# Patient Record
Sex: MALE | Race: WHITE | NOT HISPANIC OR LATINO | Employment: OTHER | URBAN - METROPOLITAN AREA
[De-identification: names, ages, dates, MRNs, and addresses within clinical notes are randomized per-mention and may not be internally consistent; named-entity substitution may affect disease eponyms.]

---

## 2018-06-16 ENCOUNTER — HOSPITAL ENCOUNTER (INPATIENT)
Facility: MEDICAL CENTER | Age: 68
LOS: 3 days | DRG: 042 | End: 2018-06-20
Attending: EMERGENCY MEDICINE | Admitting: HOSPITALIST
Payer: MEDICARE

## 2018-06-16 DIAGNOSIS — I63.512 CEREBROVASCULAR ACCIDENT (CVA) DUE TO OCCLUSION OF LEFT MIDDLE CEREBRAL ARTERY (HCC): ICD-10-CM

## 2018-06-16 DIAGNOSIS — I63.9 ACUTE CVA (CEREBROVASCULAR ACCIDENT) (HCC): ICD-10-CM

## 2018-06-16 PROCEDURE — 99285 EMERGENCY DEPT VISIT HI MDM: CPT

## 2018-06-17 ENCOUNTER — HOSPITAL ENCOUNTER (OUTPATIENT)
Dept: RADIOLOGY | Facility: MEDICAL CENTER | Age: 68
End: 2018-06-17

## 2018-06-17 ENCOUNTER — APPOINTMENT (OUTPATIENT)
Dept: RADIOLOGY | Facility: MEDICAL CENTER | Age: 68
DRG: 042 | End: 2018-06-17
Attending: NURSE PRACTITIONER
Payer: MEDICARE

## 2018-06-17 ENCOUNTER — APPOINTMENT (OUTPATIENT)
Dept: RADIOLOGY | Facility: MEDICAL CENTER | Age: 68
DRG: 042 | End: 2018-06-17
Attending: EMERGENCY MEDICINE
Payer: MEDICARE

## 2018-06-17 PROBLEM — I63.9 ACUTE CVA (CEREBROVASCULAR ACCIDENT) (HCC): Status: ACTIVE | Noted: 2018-06-17

## 2018-06-17 PROBLEM — I10 ESSENTIAL HYPERTENSION: Status: ACTIVE | Noted: 2018-06-17

## 2018-06-17 LAB
ANION GAP SERPL CALC-SCNC: 8 MMOL/L (ref 0–11.9)
APTT PPP: 30.3 SEC (ref 24.7–36)
BASOPHILS # BLD AUTO: 0.6 % (ref 0–1.8)
BASOPHILS # BLD: 0.05 K/UL (ref 0–0.12)
BUN SERPL-MCNC: 17 MG/DL (ref 8–22)
CALCIUM SERPL-MCNC: 9 MG/DL (ref 8.5–10.5)
CHLORIDE SERPL-SCNC: 110 MMOL/L (ref 96–112)
CO2 SERPL-SCNC: 22 MMOL/L (ref 20–33)
CREAT SERPL-MCNC: 0.78 MG/DL (ref 0.5–1.4)
EKG IMPRESSION: NORMAL
EOSINOPHIL # BLD AUTO: 0.2 K/UL (ref 0–0.51)
EOSINOPHIL NFR BLD: 2.5 % (ref 0–6.9)
ERYTHROCYTE [DISTWIDTH] IN BLOOD BY AUTOMATED COUNT: 43.7 FL (ref 35.9–50)
EST. AVERAGE GLUCOSE BLD GHB EST-MCNC: 114 MG/DL
GLUCOSE SERPL-MCNC: 112 MG/DL (ref 65–99)
HBA1C MFR BLD: 5.6 % (ref 0–5.6)
HCT VFR BLD AUTO: 43.7 % (ref 42–52)
HGB BLD-MCNC: 14.7 G/DL (ref 14–18)
IMM GRANULOCYTES # BLD AUTO: 0.01 K/UL (ref 0–0.11)
IMM GRANULOCYTES NFR BLD AUTO: 0.1 % (ref 0–0.9)
INR PPP: 1.03 (ref 0.87–1.13)
LYMPHOCYTES # BLD AUTO: 2.14 K/UL (ref 1–4.8)
LYMPHOCYTES NFR BLD: 26.3 % (ref 22–41)
MCH RBC QN AUTO: 30 PG (ref 27–33)
MCHC RBC AUTO-ENTMCNC: 33.6 G/DL (ref 33.7–35.3)
MCV RBC AUTO: 89.2 FL (ref 81.4–97.8)
MONOCYTES # BLD AUTO: 0.64 K/UL (ref 0–0.85)
MONOCYTES NFR BLD AUTO: 7.9 % (ref 0–13.4)
NEUTROPHILS # BLD AUTO: 5.09 K/UL (ref 1.82–7.42)
NEUTROPHILS NFR BLD: 62.6 % (ref 44–72)
NRBC # BLD AUTO: 0 K/UL
NRBC BLD-RTO: 0 /100 WBC
PLATELET # BLD AUTO: 167 K/UL (ref 164–446)
PMV BLD AUTO: 10.1 FL (ref 9–12.9)
POTASSIUM SERPL-SCNC: 3.6 MMOL/L (ref 3.6–5.5)
PROTHROMBIN TIME: 13.2 SEC (ref 12–14.6)
RBC # BLD AUTO: 4.9 M/UL (ref 4.7–6.1)
SODIUM SERPL-SCNC: 140 MMOL/L (ref 135–145)
TROPONIN I SERPL-MCNC: <0.01 NG/ML (ref 0–0.04)
TSH SERPL DL<=0.005 MIU/L-ACNC: 1.95 UIU/ML (ref 0.38–5.33)
WBC # BLD AUTO: 8.1 K/UL (ref 4.8–10.8)

## 2018-06-17 PROCEDURE — 700105 HCHG RX REV CODE 258: Performed by: HOSPITALIST

## 2018-06-17 PROCEDURE — G8988 SELF CARE GOAL STATUS: HCPCS | Mod: CI

## 2018-06-17 PROCEDURE — G8979 MOBILITY GOAL STATUS: HCPCS | Mod: CH

## 2018-06-17 PROCEDURE — 700111 HCHG RX REV CODE 636 W/ 250 OVERRIDE (IP): Performed by: HOSPITALIST

## 2018-06-17 PROCEDURE — 97166 OT EVAL MOD COMPLEX 45 MIN: CPT

## 2018-06-17 PROCEDURE — 99223 1ST HOSP IP/OBS HIGH 75: CPT | Mod: AI | Performed by: HOSPITALIST

## 2018-06-17 PROCEDURE — 85730 THROMBOPLASTIN TIME PARTIAL: CPT

## 2018-06-17 PROCEDURE — 85610 PROTHROMBIN TIME: CPT

## 2018-06-17 PROCEDURE — 700102 HCHG RX REV CODE 250 W/ 637 OVERRIDE(OP): Performed by: HOSPITALIST

## 2018-06-17 PROCEDURE — 97161 PT EVAL LOW COMPLEX 20 MIN: CPT

## 2018-06-17 PROCEDURE — G8987 SELF CARE CURRENT STATUS: HCPCS | Mod: CK

## 2018-06-17 PROCEDURE — 770020 HCHG ROOM/CARE - TELE (206)

## 2018-06-17 PROCEDURE — 36415 COLL VENOUS BLD VENIPUNCTURE: CPT

## 2018-06-17 PROCEDURE — 93005 ELECTROCARDIOGRAM TRACING: CPT | Performed by: NURSE PRACTITIONER

## 2018-06-17 PROCEDURE — 70549 MR ANGIOGRAPH NECK W/O&W/DYE: CPT

## 2018-06-17 PROCEDURE — 84443 ASSAY THYROID STIM HORMONE: CPT

## 2018-06-17 PROCEDURE — 80048 BASIC METABOLIC PNL TOTAL CA: CPT

## 2018-06-17 PROCEDURE — 70551 MRI BRAIN STEM W/O DYE: CPT

## 2018-06-17 PROCEDURE — 700117 HCHG RX CONTRAST REV CODE 255: Performed by: FAMILY MEDICINE

## 2018-06-17 PROCEDURE — 84484 ASSAY OF TROPONIN QUANT: CPT

## 2018-06-17 PROCEDURE — 83036 HEMOGLOBIN GLYCOSYLATED A1C: CPT

## 2018-06-17 PROCEDURE — G8980 MOBILITY D/C STATUS: HCPCS | Mod: CH

## 2018-06-17 PROCEDURE — 85025 COMPLETE CBC W/AUTO DIFF WBC: CPT

## 2018-06-17 PROCEDURE — A9577 INJ MULTIHANCE: HCPCS | Performed by: FAMILY MEDICINE

## 2018-06-17 PROCEDURE — G8978 MOBILITY CURRENT STATUS: HCPCS | Mod: CH

## 2018-06-17 PROCEDURE — A9270 NON-COVERED ITEM OR SERVICE: HCPCS | Performed by: HOSPITALIST

## 2018-06-17 PROCEDURE — 93306 TTE W/DOPPLER COMPLETE: CPT | Mod: 26 | Performed by: INTERNAL MEDICINE

## 2018-06-17 PROCEDURE — 93010 ELECTROCARDIOGRAM REPORT: CPT | Performed by: INTERNAL MEDICINE

## 2018-06-17 RX ORDER — SODIUM CHLORIDE 9 MG/ML
INJECTION, SOLUTION INTRAVENOUS CONTINUOUS
Status: DISCONTINUED | OUTPATIENT
Start: 2018-06-17 | End: 2018-06-18

## 2018-06-17 RX ORDER — ONDANSETRON 4 MG/1
4 TABLET, ORALLY DISINTEGRATING ORAL EVERY 4 HOURS PRN
Status: DISCONTINUED | OUTPATIENT
Start: 2018-06-17 | End: 2018-06-20 | Stop reason: HOSPADM

## 2018-06-17 RX ORDER — AMOXICILLIN 250 MG
2 CAPSULE ORAL 2 TIMES DAILY
Status: DISCONTINUED | OUTPATIENT
Start: 2018-06-17 | End: 2018-06-20 | Stop reason: HOSPADM

## 2018-06-17 RX ORDER — ONDANSETRON 2 MG/ML
4 INJECTION INTRAMUSCULAR; INTRAVENOUS EVERY 4 HOURS PRN
Status: DISCONTINUED | OUTPATIENT
Start: 2018-06-17 | End: 2018-06-20 | Stop reason: HOSPADM

## 2018-06-17 RX ORDER — ACETAMINOPHEN 325 MG/1
650 TABLET ORAL EVERY 6 HOURS PRN
Status: DISCONTINUED | OUTPATIENT
Start: 2018-06-17 | End: 2018-06-20 | Stop reason: HOSPADM

## 2018-06-17 RX ORDER — POLYETHYLENE GLYCOL 3350 17 G/17G
1 POWDER, FOR SOLUTION ORAL
Status: DISCONTINUED | OUTPATIENT
Start: 2018-06-17 | End: 2018-06-20 | Stop reason: HOSPADM

## 2018-06-17 RX ORDER — LABETALOL HYDROCHLORIDE 5 MG/ML
10 INJECTION, SOLUTION INTRAVENOUS EVERY 4 HOURS PRN
Status: DISCONTINUED | OUTPATIENT
Start: 2018-06-17 | End: 2018-06-20 | Stop reason: HOSPADM

## 2018-06-17 RX ORDER — BISACODYL 10 MG
10 SUPPOSITORY, RECTAL RECTAL
Status: DISCONTINUED | OUTPATIENT
Start: 2018-06-17 | End: 2018-06-20 | Stop reason: HOSPADM

## 2018-06-17 RX ORDER — ASPIRIN 300 MG/1
300 SUPPOSITORY RECTAL DAILY
Status: DISCONTINUED | OUTPATIENT
Start: 2018-06-17 | End: 2018-06-20 | Stop reason: HOSPADM

## 2018-06-17 RX ORDER — ASPIRIN 81 MG/1
324 TABLET, CHEWABLE ORAL DAILY
Status: DISCONTINUED | OUTPATIENT
Start: 2018-06-17 | End: 2018-06-20 | Stop reason: HOSPADM

## 2018-06-17 RX ORDER — HYDRALAZINE HYDROCHLORIDE 20 MG/ML
10 INJECTION INTRAMUSCULAR; INTRAVENOUS
Status: DISCONTINUED | OUTPATIENT
Start: 2018-06-17 | End: 2018-06-20 | Stop reason: HOSPADM

## 2018-06-17 RX ORDER — ATORVASTATIN CALCIUM 80 MG/1
80 TABLET, FILM COATED ORAL EVERY EVENING
Status: DISCONTINUED | OUTPATIENT
Start: 2018-06-17 | End: 2018-06-20 | Stop reason: HOSPADM

## 2018-06-17 RX ORDER — ASPIRIN 325 MG
325 TABLET ORAL DAILY
Status: DISCONTINUED | OUTPATIENT
Start: 2018-06-17 | End: 2018-06-20 | Stop reason: HOSPADM

## 2018-06-17 RX ADMIN — GADOBENATE DIMEGLUMINE 10 ML: 529 INJECTION, SOLUTION INTRAVENOUS at 14:43

## 2018-06-17 RX ADMIN — ASPIRIN 325 MG: 325 TABLET ORAL at 08:43

## 2018-06-17 RX ADMIN — ENOXAPARIN SODIUM 40 MG: 100 INJECTION SUBCUTANEOUS at 08:43

## 2018-06-17 RX ADMIN — SODIUM CHLORIDE: 9 INJECTION, SOLUTION INTRAVENOUS at 06:08

## 2018-06-17 RX ADMIN — ATORVASTATIN CALCIUM 80 MG: 80 TABLET, FILM COATED ORAL at 20:42

## 2018-06-17 ASSESSMENT — COGNITIVE AND FUNCTIONAL STATUS - GENERAL
HELP NEEDED FOR BATHING: A LITTLE
MOBILITY SCORE: 24
MOBILITY SCORE: 24
HELP NEEDED FOR BATHING: A LOT
SUGGESTED CMS G CODE MODIFIER MOBILITY: CH
DRESSING REGULAR UPPER BODY CLOTHING: A LITTLE
SUGGESTED CMS G CODE MODIFIER DAILY ACTIVITY: CK
DAILY ACTIVITIY SCORE: 19
SUGGESTED CMS G CODE MODIFIER DAILY ACTIVITY: CK
TOILETING: A LITTLE
PERSONAL GROOMING: A LITTLE
SUGGESTED CMS G CODE MODIFIER MOBILITY: CH
PERSONAL GROOMING: A LITTLE
DRESSING REGULAR LOWER BODY CLOTHING: A LITTLE
DAILY ACTIVITIY SCORE: 17
DRESSING REGULAR LOWER BODY CLOTHING: A LITTLE
TOILETING: A LOT
DRESSING REGULAR UPPER BODY CLOTHING: A LITTLE

## 2018-06-17 ASSESSMENT — ENCOUNTER SYMPTOMS
GASTROINTESTINAL NEGATIVE: 1
FOCAL WEAKNESS: 1
RESPIRATORY NEGATIVE: 1
TINGLING: 1
SPEECH CHANGE: 1
MUSCULOSKELETAL NEGATIVE: 1
EYES NEGATIVE: 1
PSYCHIATRIC NEGATIVE: 1
CONSTITUTIONAL NEGATIVE: 1
CARDIOVASCULAR NEGATIVE: 1
SENSORY CHANGE: 1

## 2018-06-17 ASSESSMENT — LIFESTYLE VARIABLES
EVER_SMOKED: YES
ALCOHOL_USE: YES
TOTAL SCORE: 0
EVER HAD A DRINK FIRST THING IN THE MORNING TO STEADY YOUR NERVES TO GET RID OF A HANGOVER: NO
HAVE YOU EVER FELT YOU SHOULD CUT DOWN ON YOUR DRINKING: NO
CONSUMPTION TOTAL: INCOMPLETE
HAVE YOU EVER FELT YOU SHOULD CUT DOWN ON YOUR DRINKING: NO
DO YOU DRINK ALCOHOL: NO
EVER HAD A DRINK FIRST THING IN THE MORNING TO STEADY YOUR NERVES TO GET RID OF A HANGOVER: NO
EVER FELT BAD OR GUILTY ABOUT YOUR DRINKING: NO
TOTAL SCORE: 0
ALCOHOL_USE: YES
TOTAL SCORE: 0
TOTAL SCORE: 0
HAVE PEOPLE ANNOYED YOU BY CRITICIZING YOUR DRINKING: NO
TOTAL SCORE: 0
CONSUMPTION TOTAL: INCOMPLETE
EVER FELT BAD OR GUILTY ABOUT YOUR DRINKING: NO
HAVE PEOPLE ANNOYED YOU BY CRITICIZING YOUR DRINKING: NO
TOTAL SCORE: 0

## 2018-06-17 ASSESSMENT — ACTIVITIES OF DAILY LIVING (ADL): TOILETING: INDEPENDENT

## 2018-06-17 ASSESSMENT — PAIN SCALES - GENERAL
PAINLEVEL_OUTOF10: 0

## 2018-06-17 ASSESSMENT — GAIT ASSESSMENTS
GAIT LEVEL OF ASSIST: SUPERVISED
DISTANCE (FEET): 1000

## 2018-06-17 ASSESSMENT — PATIENT HEALTH QUESTIONNAIRE - PHQ9
1. LITTLE INTEREST OR PLEASURE IN DOING THINGS: NOT AT ALL
2. FEELING DOWN, DEPRESSED, IRRITABLE, OR HOPELESS: NOT AT ALL
SUM OF ALL RESPONSES TO PHQ9 QUESTIONS 1 AND 2: 0

## 2018-06-17 NOTE — PROGRESS NOTES
- Patient transfer here from [facility] this morning for Left MCA occlusion   - right-sided weakness now resolved. Question some very mild expressive aphasia. No other neuro deficits.  - MRI done with results pending  - consulted Dr. Virgen - Neurology  BRADY Gray

## 2018-06-17 NOTE — THERAPY
"Occupational Therapy Evaluation completed.   Functional Status:  Pt is a 67 y/o male transferred from [facility] for R sided weakness/expressive aphasia. MRI and CT  revealed a L distal M1 occluded CVA with watershed pattern. Pt has moderate-severe Broca's aphasia, often interchanging words. Pt appears to have global aphasia as patient cannot confirm statements prior said with any accuracy. Pt reports living with girlfriend in a H outside [place]. Pt reports generalized numbness of R hand distal of shoulder, no numbness in RLE. Pt has 3+/5 RUE strength, 5/5 on LUE. Pt has dysdiadochokinesia, poor proprioception. Pt denied vision loss, but does not consistently track objects peripherally. Pt ambulated in hallway with fair- balance, is a severe fall risk. Pt was frustrated at EOS with all of testing performed, has poor insight into deficits. Pt would be an ideal candidate for acute rehabilitation d/t inability to care for ADLs safely at this time.   Plan of Care: Will benefit from Occupational Therapy 3 times per week  Discharge Recommendations:  Equipment: Will Continue to Assess for Equipment Needs. Post-acute therapy Discharge to a transitional care facility for continued skilled therapy services.    See \"Rehab Therapy-Acute\" Patient Summary Report for complete documentation.    "

## 2018-06-17 NOTE — PROGRESS NOTES
Pt is A&Ox4 on RA. Pt is on tele. Pt denies needs or pain at this time. Hourly rounding in place.

## 2018-06-17 NOTE — ASSESSMENT & PLAN NOTE
- not currently on any medication  - several days out from acute CVA -- period of permissive HTN to wean and start BP control PRN

## 2018-06-17 NOTE — ED NOTES
Patient transported to floor via wheelchair in stable condition accompanied by ED Tech. All belongings accounted for.

## 2018-06-17 NOTE — CONSULTS
CC:  HAD A STROKE    Date of Admission: 6/16/2018    Today's Date: 06/17/18    Consulting Physician: Goyo Xie M.D.      HPI:    Cortez Mari is a 68 y.o. male offers little hx, per chart >24hrs of symptoms severe right sided weakness and speech difficulty. Denies drug or sympathomimetics.  No other complaints.  Unable to obtain more details of hx.      ROS:   Constitutional: No fevers or chills.  Eyes: No blurry vision or eye pain.  ENT: No dysphagia or hearing loss.  Respiratory: No cough or shortness of breath.  Cardiovascular: No chest pain or palpitations.  GI: No nausea, vomiting, or diarrhea.  : No urinary incontinence or dysuria.  Musculoskeletal: No joint swelling or arthralgias.  Skin: No skin rashes.  Neuro: See HPI.  Endocrine: No heat or cold intolerance. No polydipsia or polyuria.  Psych: No depression or anxiety.  Heme/Lymph: No easy bruising or swollen lymph nodes.  All other systems were reviewed and were negative.       Past Medical History:   Past Medical History:   Diagnosis Date   • Smoker        Past Surgical History:   Past Surgical History:   Procedure Laterality Date   • INGUINAL HERNIA REPAIR  2/14/2011    Performed by KELLY CHIN at SURGERY SAME DAY [facility] ORS   • OTHER      eye       Social History:   Social History     Social History   • Marital status:      Spouse name: N/A   • Number of children: N/A   • Years of education: N/A     Occupational History   • Not on file.     Social History Main Topics   • Smoking status: Current Every Day Smoker     Packs/day: 1.00     Types: Cigarettes   • Smokeless tobacco: Never Used      Comment: 0.5ppd x35 yrs   • Alcohol use No   • Drug use: No   • Sexual activity: Not on file     Other Topics Concern   • Not on file     Social History Narrative   • No narrative on file       Family History: History reviewed. No pertinent family history.    Allergies: No Known Allergies      Current Facility-Administered  Medications:   •  NS infusion, , Intravenous, Continuous, Goyo Xie M.D., Last Rate: 100 mL/hr at 06/17/18 0848  •  enoxaparin (LOVENOX) inj 40 mg, 40 mg, Subcutaneous, DAILY, Christian Kidd M.D., 40 mg at 06/17/18 0843  •  acetaminophen (TYLENOL) tablet 650 mg, 650 mg, Oral, Q6HRS PRN, Christian Kidd M.D.  •  Pharmacy Consult Request, , Other, PRN, Christian Kidd M.D.  •  ondansetron (ZOFRAN) syringe/vial injection 4 mg, 4 mg, Intravenous, Q4HRS PRN, Christian Kidd M.D.  •  ondansetron (ZOFRAN ODT) dispertab 4 mg, 4 mg, Oral, Q4HRS PRN, Christian Kidd M.D.  •  senna-docusate (PERICOLACE or SENOKOT S) 8.6-50 MG per tablet 2 Tab, 2 Tab, Oral, BID **AND** polyethylene glycol/lytes (MIRALAX) PACKET 1 Packet, 1 Packet, Oral, QDAY PRN **AND** magnesium hydroxide (MILK OF MAGNESIA) suspension 30 mL, 30 mL, Oral, QDAY PRN **AND** bisacodyl (DULCOLAX) suppository 10 mg, 10 mg, Rectal, QDAY PRN, Christian Kidd M.D.  •  atorvastatin (LIPITOR) tablet 80 mg, 80 mg, Oral, Q EVENING, Christian Kidd M.D.  •  labetalol (NORMODYNE,TRANDATE) injection 10 mg, 10 mg, Intravenous, Q4HRS PRN **OR** hydrALAZINE (APRESOLINE) injection 10 mg, 10 mg, Intravenous, Q2HRS PRN, Christian Kidd M.D.  •  aspirin (ASA) tablet 325 mg, 325 mg, Oral, DAILY, 325 mg at 06/17/18 0843 **OR** aspirin (ASA) chewable tab 324 mg, 324 mg, Oral, DAILY **OR** aspirin (ASA) suppository 300 mg, 300 mg, Rectal, DAILY, Christian Kidd M.D.      PHYSICAL EXAM    Vitals:    06/17/18 0345 06/17/18 0403 06/17/18 0800 06/17/18 1200   BP:  147/94 124/77 147/87   Pulse: 76  75 72   Resp:  16 15 15   Temp:  36.8 °C (98.2 °F) 36.5 °C (97.7 °F) 36.6 °C (97.9 °F)   SpO2: 95%  93% 96%   Weight:       Height:           Head/Neck: NCAT no meningismus neg kernig neg brudzinski. No obvious mass or heard bruit. No tender arteries or lost pulses.  No rash of head or neck.  Skin: Warm, dry, intact. No rashes observed head/neck or body  Eyes/Funduscopic: Optic discs flat  with no evidence of papilledema or pallor. Normal posterior segments.    Mental Status: Awake, alert, oriented PERSON, 31YO, DEC. NAMES SLOW AND PERSEVERATES AND SPORADICALLY, follows commands. No neglect/extinction. Attention and concentration, recent & remote memory, Fund of Knowledge wnl.  Cranial Nerves: R FACE WEAK BELOW EYE MILD OTHERWISE CN II-XII intact. PERRL.   No afferent pupillary defect. EOM full. VF full. sym eye closure. No nystagmus.     Motor: RIGHT ARM AND LEG DRIFTbulk/tone wnl. no abn mvmts   Sensory: Intact light touch & sharp  Coordination: finger to nose & heel to shin intact.   DTR's: intact/sym. no clonus. Toes mute.  Gait/Station: n/a fall concern         Labs:  Recent Labs      06/17/18   0155   WBC  8.1   RBC  4.90   HEMOGLOBIN  14.7   HEMATOCRIT  43.7   MCV  89.2   MCH  30.0   MCHC  33.6*   RDW  43.7   PLATELETCT  167   MPV  10.1     Recent Labs      06/17/18   0155   SODIUM  140   POTASSIUM  3.6   CHLORIDE  110   CO2  22   GLUCOSE  112*   BUN  17   CREATININE  0.78   CALCIUM  9.0     Recent Labs      06/17/18   0155   APTT  30.3   INR  1.03         Recent Labs      06/17/18   0155   TROPONINI  <0.01         Recent Labs      06/17/18   0155   SODIUM  140   POTASSIUM  3.6   CHLORIDE  110   CO2  22   GLUCOSE  112*   BUN  17     Recent Labs      06/17/18   0155   SODIUM  140   POTASSIUM  3.6   CHLORIDE  110   CO2  22   BUN  17   CREATININE  0.78   CALCIUM  9.0     Recent Labs      06/17/18   0155   APTT  30.3   INR  1.03     No results found for this or any previous visit.           Imaging: neuroimaging reviewed and directly visualized by me  MR-BRAIN-W/O   Final Result         1. Age-related cerebral atrophy.      2. Mild periventricular white matter changes consistent with chronic microvascular ischemic gliosis.      3. Occlusion of distal left M1 segment.      4. Multifocal areas of acute infarction involving the left frontal, parietal, posterior temporal, and lateral occipital lobes,  as well as the left-sided basal ganglia. No significant intracranial mass effect.      CT-FOREIGN FILM CAT SCAN   Final Result      OUTSIDE IMAGES-CT HEAD   Final Result      Echocardiogram Comp W/O cont    (Results Pending)   MR-MRA NECK-WITH    (Results Pending)       Assessment/Plan:    MODERATE VOLUME LEFT HEMISPHERIC ( LEFT M1 CLOT ON CTA) ACUTE ISCHEMIC STROKES, HYPOPERFUSION WATERSHED PATTERN LIKELY DUE TO M1 CLOT WITH EXCELLENT COLLATERAL RECONSTITUTION OF FLOW RETROGRADE.     NOT IVTPA CANDIDATE NOR OSCAR CANDIDATE OUT OF WINDOW.    NIHSS score:  1a. LOC:   1b. LOC Questions: 2  1c. LOC Commands:   2. Best Gaze:   3. Visual Fields:   4. Facial Paresis: 1  5a. Motor arm left:   5b. Motor arm right: 1  6a. Motor leg left:   6b. Motor leg right: 1  7. Sensory:  8. Best Language: 2  9. Limb Ataxia:   10. Dysarthria:   11. Extinction/Inattention:     Total NIHSS: 7    Decision NOT to give alteplase: ON ADMIT  Contraindication for IVtPA: OUT OF WINDOW ON ARRIVAL    Presumed mechanism by TOAST:  __Large Artery Atherosclerosis  __Small Vessel (Lacunar)  _X_Cardioembolic  __Other   __Unknown      Workup    MRA NECK, small plaques on CTA neck, one seen at [facility] does not seem flow limiting   TTE with bubble, telemetry. Card MD consult for need for long term ICM or VENICE  Stroke labs include lipids/TFT/a1c/trop/UA    Treatment    Aspirin 325MG PO q daily, if need full anticoagulation for AFIB or other reason then should wait 7-10 days, repeat CT head to r/o ICH then could start aspirin when anticoag therapeutic.     STATIN FULL DOSE CRESTOR OR LIPITOR  ACEI/ARB, HCTZ, AND/OR CCB FOR JNC GOALS   BG MONITOR/CNTRL, DIET & LIFESTYLE/AVOID SMOKING DISCUSSED    No further neuro workup as inpatient if aforementioned studies WNL & maintains neuro baseline.  Neuro sign off, reconsult PRN.  F/u otpt Neuro ASAP.       Gregory Virgen M.D.  , Neurohospitalist & Stroke  Clinical Professor, [employer]  Diplomate,  Neurology & Neuroimaging

## 2018-06-17 NOTE — ED NOTES
Carina fall assessment completed. Pt is High risk for fall. Interventions complete. Pt placed in yellow non slip socks, wrist band placed, green sign on door. Bed locked in low position, call light in place. Personal possessions in place.  Personal needs assessed. Charge nurse notified to move pt to a more visible room if available. Safety assessed. Will monitor frequently

## 2018-06-17 NOTE — PROGRESS NOTES
Patient admitted to floor overnight. Attempted skin assessment but patient refused. Patient denies wounds. Charge RN aware.

## 2018-06-17 NOTE — PROGRESS NOTES
Patient admitted to Neuro floor this AM. NIHSS performed, Admission completed, SCD's applied, and Maintenance fluids started. Patient denies pain. VSS. No other needs.

## 2018-06-17 NOTE — ED TRIAGE NOTES
"Cortez Mari  68 y.o.  Chief Complaint   Patient presents with   • Possible Stroke     BIB EMS from [facility] for above.    Patient woke up yesterday with R arm weakness/numbness and slurred speech. Presented to [facility] ED at 2100 for above.    CTA head/neck at [facility] showed \"occlusion midportion left M1 segment. Relative decreased complement of vessels in the area of infarct of the left MCA distribution consistent with a vascular deficit.    ERP Dr. Sanchez at bedside upon patient arrival.    Patient currently A & O x 4, GCS 15.    NIHSS 1 with Dr. Sanchez.  "

## 2018-06-17 NOTE — H&P
Hospital Medicine History and Physical    Date of Service  6/17/2018    Chief Complaint  Chief Complaint   Patient presents with   • Possible Stroke       History of Presenting Illness  68 y.o. male with no reported medical history was in his usual state of health until the day prior to admission, approximately 26 hours before this evaluation when he began to have numbness and weakness in his right hand, associated with difficulty speaking correctly.  He reports that these symptoms increased, and he presented to outside facility for evaluation.  After imaging showed an M1 segment defect, patient was transferred to this facility for possible thrombectomy.  He currently denies headache or vision changes.  No fever or chills, no other complaints.  No exacerbating or alleviating factors.       Primary Care Physician  Pcp Pt States None    Consultants  none    Code Status  Full code     Review of Systems  Review of Systems   Constitutional: Negative.    HENT: Negative.    Eyes: Negative.    Respiratory: Negative.    Cardiovascular: Negative.    Gastrointestinal: Negative.    Genitourinary: Negative.    Musculoskeletal: Negative.    Skin: Negative.    Neurological: Positive for tingling, sensory change, speech change and focal weakness.   Endo/Heme/Allergies: Negative.    Psychiatric/Behavioral: Negative.         Past Medical History  Past Medical History:   Diagnosis Date   • Smoker        Surgical History  Past Surgical History:   Procedure Laterality Date   • INGUINAL HERNIA REPAIR  2/14/2011    Performed by KELLY CHIN at SURGERY SAME DAY [facility] ORS   • OTHER      eye       Medications  No current facility-administered medications on file prior to encounter.      No current outpatient prescriptions on file prior to encounter.       Family History  History reviewed. No pertinent family history.    Social History  Social History   Substance Use Topics   • Smoking status: Current Every Day Smoker      Packs/day: 1.00     Types: Cigarettes   • Smokeless tobacco: Never Used      Comment: 0.5ppd x35 yrs   • Alcohol use No       Allergies  No Known Allergies     Physical Exam  Laboratory   Hemodynamics  Temp (24hrs), Av.8 °C (98.3 °F), Min:36.8 °C (98.3 °F), Max:36.8 °C (98.3 °F)   Temperature: 36.8 °C (98.3 °F)  Pulse  Av.7  Min: 52  Max: 83 Heart Rate (Monitored): 73  NIBP: (!) 162/105      Respiratory      Respiration: 19, Pulse Oximetry: 96 %             Physical Exam   Constitutional: He is oriented to person, place, and time. He appears well-developed and well-nourished. No distress.   HENT:   Head: Normocephalic.   Eyes: Pupils are equal, round, and reactive to light.   Neck: Normal range of motion. Neck supple.   Cardiovascular: Normal rate, regular rhythm and normal heart sounds.  Exam reveals no gallop and no friction rub.    No murmur heard.  Pulmonary/Chest: Effort normal and breath sounds normal. No respiratory distress. He has no wheezes.   Abdominal: Soft. Bowel sounds are normal. He exhibits no distension and no mass. There is tenderness. There is no rebound and no guarding.   Musculoskeletal: Normal range of motion. He exhibits no edema.   Decreased strength to right arm and hand, numbness, with acute M1 thrombus and acute MRI.      Neurological: He is alert and oriented to person, place, and time. No cranial nerve deficit.   Skin: He is not diaphoretic.   Nursing note and vitals reviewed.      Recent Labs      18   015   WBC  8.1   RBC  4.90   HEMOGLOBIN  14.7   HEMATOCRIT  43.7   MCV  89.2   MCH  30.0   MCHC  33.6*   RDW  43.7   PLATELETCT  167   MPV  10.1     Recent Labs      18   015   SODIUM  140   POTASSIUM  3.6   CHLORIDE  110   CO2  22   GLUCOSE  112*   BUN  17   CREATININE  0.78   CALCIUM  9.0     Recent Labs      18   0155   GLUCOSE  112*     Recent Labs      18   APTT  30.3   INR  1.03             Lab Results   Component Value Date    TROPONINI  <0.01 06/17/2018     Urinalysis:  No results found for: SPECGRAVITY, GLUCOSEUR, KETONES, NITRITE, WBCURINE, RBCURINE, BACTERIA, EPITHELCELL     Imaging  CTA head from [facility] shows M1 occlusion on left     MRI with acute ischemic event noted   Assessment/Plan     I anticipate this patient will require at least two midnights for appropriate medical management, necessitating inpatient admission.    * Acute CVA (cerebrovascular accident) (HCC)   Assessment & Plan    With right hand and arm numbness, poor coordination, and Broca's aphasia.  Outside of window for TPA or thrombectomy despite clot in M1 segment.  Plan for close monitoring, ASA, PT and OT, speech evaluation.          Essential hypertension   Assessment & Plan    Not currently on medication regimen.  Will allow permissive hypertension given CVA.             VTE prophylaxis: SCD, lovenox.

## 2018-06-17 NOTE — ASSESSMENT & PLAN NOTE
- occlusion of Left MCA  - ECHO unremarkable - no clear bubble study - may benefit from VENICE  - RUE numbness resolved. Continues with RUE paresis, alexia, aphasia  - SLP consult pending  - PT/OT - possible candidate for [facility]   - ASA and statin  - telemetry - has shown no events   - neuro checks  - Discussed w cards who will follow up as OP for ILR possible VENICE

## 2018-06-17 NOTE — THERAPY
"69 y/o male adm for right UE weakness, dx: right left MCA occlusion, no deficits noted. Intact motor and sensory components BLE. BLE coordination intact. Balance intact with higher level balance activities on level ground ambulation with no AD. No further acute PT services required at this time    Physical Therapy Evaluation completed.   Bed Mobility:  Supine to Sit: Independent  Transfers: Sit to Stand: Supervised  Gait: Level Of Assist: Supervised with No Equipment Needed       Plan of Care: Patient with no further skilled PT needs in the acute care setting at this time  Discharge Recommendations: Equipment: No Equipment Needed. Post-acute therapy Currently anticipate no further skilled physical therapy needs once patient is discharged from the inpatient setting.    See \"Rehab Therapy-Acute\" Patient Summary Report for complete documentation.     "

## 2018-06-17 NOTE — ED PROVIDER NOTES
ED Provider Note    ED Provider Note    Primary care provider: Pcp Pt States None  Means of arrival: EMS  History obtained from: Patient    CHIEF COMPLAINT  Chief Complaint   Patient presents with   • Possible Stroke     Seen at 12:03 AM.   HPI  Cortez Mari is a 68 y.o. male who presents to the Emergency Department with weakness and decreased coordination of the right hand. The patient is right-hand-dominant, he 1st noticed the symptoms yesterday evening when he awoke from a nap. This is approximately 10 PM (26 hours ago). He notices the symptoms have been persistent since then. He does not have any other associated symptoms, specifically denies any headache, neck pain, chest pain, numbness, weakness in other extremities, visual changes, confusion.    He went to [facility] where a CTA of the head showed a midportion M1 occlusion on the left. The patient was transferred to this facility for possible thrombectomy.    REVIEW OF SYSTEMS  See HPI,   Remainder of ROS negative.     PAST MEDICAL HISTORY   has a past medical history of Smoker.    SURGICAL HISTORY   has a past surgical history that includes other and inguinal hernia repair (2/14/2011).    SOCIAL HISTORY  Social History   Substance Use Topics   • Smoking status: Current Every Day Smoker     Packs/day: 1.00     Types: Cigarettes   • Smokeless tobacco: Never Used      Comment: 0.5ppd x35 yrs   • Alcohol use No      History   Drug Use No       FAMILY HISTORY  History reviewed. No pertinent family history.    CURRENT MEDICATIONS  Reviewed.  See Encounter Summary.     ALLERGIES  No Known Allergies    PHYSICAL EXAM  VITAL SIGNS: /94   Pulse 76   Temp 36.8 °C (98.2 °F)   Resp 16   Ht 1.829 m (6')   Wt 81.6 kg (180 lb)   SpO2 95%   BMI 24.41 kg/m²   Constitutional: Awake, alert in no apparent distress.  HENT: Normocephalic, Bilateral external ears normal. Nose normal.   Eyes: Conjunctiva normal, non-icteric, EOMI.    Thorax & Lungs: Easy  unlabored respirations, Clear to ascultation bilaterally.  Cardiovascular: Regular rate, Regular rhythm, No murmurs, rubs or gallops.  Abdomen:  Soft, nontender, nondistended, normal active bowel sounds.   :    Skin: Visualized skin is  Dry, No erythema, No rash.   Musculoskeletal:   No cyanosis, clubbing or edema.  Neurologic: Alert, CN II-XII intact. Sensation intact all extremities. 5/5 strength throughout, with the exception of the right upper extremity where the patient has pronator drift, significantly decreased  strength, decreased wrist extensions and decreased forearm strength. Globally of the right upper extremity strength is approximately 4- /5.  . DTR 2+ at bilateral bicep, BR, patella. Negative rhonberg, no dysmetria. Heel to shin executed normally. Normal speech. No extinction.  He does appear to have some difficulty following commands at times.  Psychiatric: Normal affect, Normal mood  Lymphatic:  No cervical LAD    EKG   12 lead Interpreted by me  Rhythm:  Normal sinus rhythm   Rate: 63  Axis: normal  Ectopy: none  Conduction: normal  ST Segments: no acute change  T Waves: no acute change  Clinical Impression: Normal EKG without acute changes     RADIOLOGY  CT-FOREIGN FILM CAT SCAN   Final Result      OUTSIDE IMAGES-CT HEAD   Final Result      MR-BRAIN-W/O    (Results Pending)   Echocardiogram Comp W/O cont    (Results Pending)     The radiologist's interpretation of all radiological studies have been reviewed by me.    COURSE & MEDICAL DECISION MAKING  Pertinent Labs & Imaging studies reviewed. (See chart for details)    12:03 AM - Medical record reviewed, patient seen and examined at bedside. NIHSS 3    12:11 AM - I discussed the time of onset, the patient's clinical exam and radiology results with Dr. Jewell (IR). Based on the extremely delayed presentation, he recommends an MRI brain without to assess for any viable tissue.    12:14 AM - radiology aware of need for emergent MRI.    2:12 AM -  MRI, T2 changes concerning for infarct. Flair signal changes. Will contact Dr. Jewell    2:15 AM - Dr. Jewell to review images.     2:20 AM - per Dr. Jewell is a large area of completed infarct on MRI. Therefore there is no benefit to doing a thrombectomy. The case was discussed with Dr. Kidd who will evaluate the patient for admission. I updated the patient and his family with the plan.    Decision Making:  This is a 68 y.o. year old male who presents with decreased coordination and weakness of the right upper extremity. The patient clearly has suffered a stroke of the left MCA. He was sent here for thrombectomy though the patient is well outside of any standard window. Because of his extremely late presentation, I discussed the case with interventional radiology who recommended an MRI to see if there is any salvageable material. Emergent MRI was completed, the patient has already infarcted the M1 distribution. Therefore there is no benefit to thrombectomy. The patient will now be admitted for rehab, medical management and likely 2-D echo.      The patient was admitted to the hospitalist in guarded condition.    FINAL IMPRESSION  1. Cerebrovascular accident (CVA) due to occlusion of left middle cerebral artery (HCC)

## 2018-06-18 PROBLEM — E78.5 HYPERLIPIDEMIA: Status: ACTIVE | Noted: 2018-06-18

## 2018-06-18 PROBLEM — I10 ESSENTIAL HYPERTENSION: Chronic | Status: ACTIVE | Noted: 2018-06-17

## 2018-06-18 PROBLEM — R48.0 ALEXIA: Status: ACTIVE | Noted: 2018-06-18

## 2018-06-18 LAB
ANION GAP SERPL CALC-SCNC: 6 MMOL/L (ref 0–11.9)
BASOPHILS # BLD AUTO: 0.6 % (ref 0–1.8)
BASOPHILS # BLD: 0.05 K/UL (ref 0–0.12)
BUN SERPL-MCNC: 12 MG/DL (ref 8–22)
CALCIUM SERPL-MCNC: 8.4 MG/DL (ref 8.5–10.5)
CHLORIDE SERPL-SCNC: 111 MMOL/L (ref 96–112)
CHOLEST SERPL-MCNC: 212 MG/DL (ref 100–199)
CO2 SERPL-SCNC: 22 MMOL/L (ref 20–33)
CREAT SERPL-MCNC: 0.62 MG/DL (ref 0.5–1.4)
EOSINOPHIL # BLD AUTO: 0.15 K/UL (ref 0–0.51)
EOSINOPHIL NFR BLD: 1.7 % (ref 0–6.9)
ERYTHROCYTE [DISTWIDTH] IN BLOOD BY AUTOMATED COUNT: 43.8 FL (ref 35.9–50)
GLUCOSE SERPL-MCNC: 97 MG/DL (ref 65–99)
HCT VFR BLD AUTO: 43.9 % (ref 42–52)
HDLC SERPL-MCNC: 31 MG/DL
HGB BLD-MCNC: 14.4 G/DL (ref 14–18)
IMM GRANULOCYTES # BLD AUTO: 0.02 K/UL (ref 0–0.11)
IMM GRANULOCYTES NFR BLD AUTO: 0.2 % (ref 0–0.9)
LDLC SERPL CALC-MCNC: 162 MG/DL
LV EJECT FRACT  99904: 65
LV EJECT FRACT MOD 2C 99903: 67.11
LV EJECT FRACT MOD 4C 99902: 68.11
LV EJECT FRACT MOD BP 99901: 67.76
LYMPHOCYTES # BLD AUTO: 1.99 K/UL (ref 1–4.8)
LYMPHOCYTES NFR BLD: 23.1 % (ref 22–41)
MCH RBC QN AUTO: 29.6 PG (ref 27–33)
MCHC RBC AUTO-ENTMCNC: 32.8 G/DL (ref 33.7–35.3)
MCV RBC AUTO: 90.1 FL (ref 81.4–97.8)
MONOCYTES # BLD AUTO: 0.68 K/UL (ref 0–0.85)
MONOCYTES NFR BLD AUTO: 7.9 % (ref 0–13.4)
NEUTROPHILS # BLD AUTO: 5.73 K/UL (ref 1.82–7.42)
NEUTROPHILS NFR BLD: 66.5 % (ref 44–72)
NRBC # BLD AUTO: 0 K/UL
NRBC BLD-RTO: 0 /100 WBC
PLATELET # BLD AUTO: 168 K/UL (ref 164–446)
PMV BLD AUTO: 10.3 FL (ref 9–12.9)
POTASSIUM SERPL-SCNC: 3.8 MMOL/L (ref 3.6–5.5)
RBC # BLD AUTO: 4.87 M/UL (ref 4.7–6.1)
SODIUM SERPL-SCNC: 139 MMOL/L (ref 135–145)
TRIGL SERPL-MCNC: 97 MG/DL (ref 0–149)
VIT B12 SERPL-MCNC: 320 PG/ML (ref 211–911)
WBC # BLD AUTO: 8.6 K/UL (ref 4.8–10.8)

## 2018-06-18 PROCEDURE — 36415 COLL VENOUS BLD VENIPUNCTURE: CPT

## 2018-06-18 PROCEDURE — 770006 HCHG ROOM/CARE - MED/SURG/GYN SEMI*

## 2018-06-18 PROCEDURE — 80061 LIPID PANEL: CPT

## 2018-06-18 PROCEDURE — 85025 COMPLETE CBC W/AUTO DIFF WBC: CPT

## 2018-06-18 PROCEDURE — 93306 TTE W/DOPPLER COMPLETE: CPT

## 2018-06-18 PROCEDURE — 700102 HCHG RX REV CODE 250 W/ 637 OVERRIDE(OP): Performed by: HOSPITALIST

## 2018-06-18 PROCEDURE — A9270 NON-COVERED ITEM OR SERVICE: HCPCS | Performed by: HOSPITALIST

## 2018-06-18 PROCEDURE — 700111 HCHG RX REV CODE 636 W/ 250 OVERRIDE (IP): Performed by: HOSPITALIST

## 2018-06-18 PROCEDURE — 80048 BASIC METABOLIC PNL TOTAL CA: CPT

## 2018-06-18 PROCEDURE — 82607 VITAMIN B-12: CPT

## 2018-06-18 PROCEDURE — 99233 SBSQ HOSP IP/OBS HIGH 50: CPT | Performed by: FAMILY MEDICINE

## 2018-06-18 RX ADMIN — ENOXAPARIN SODIUM 40 MG: 100 INJECTION SUBCUTANEOUS at 08:51

## 2018-06-18 RX ADMIN — ATORVASTATIN CALCIUM 80 MG: 80 TABLET, FILM COATED ORAL at 20:20

## 2018-06-18 RX ADMIN — ASPIRIN 325 MG: 325 TABLET ORAL at 08:51

## 2018-06-18 ASSESSMENT — ENCOUNTER SYMPTOMS
DOUBLE VISION: 0
FEVER: 0
HEADACHES: 0
VOMITING: 0
NAUSEA: 0
HEARTBURN: 0
PALPITATIONS: 0
CHILLS: 0
SHORTNESS OF BREATH: 0
DIZZINESS: 0
COUGH: 0
SPEECH CHANGE: 1
BLURRED VISION: 0
ABDOMINAL PAIN: 0
FALLS: 0
BACK PAIN: 0
NERVOUS/ANXIOUS: 0

## 2018-06-18 ASSESSMENT — PAIN SCALES - GENERAL
PAINLEVEL_OUTOF10: 0

## 2018-06-18 ASSESSMENT — LIFESTYLE VARIABLES: SUBSTANCE_ABUSE: 0

## 2018-06-18 NOTE — PROGRESS NOTES
Monitor Summary:  SR 61-, [place] .16, QRS .08, QT .34 with O coup,and  R PVC,  per strip from monitor room.

## 2018-06-18 NOTE — PROGRESS NOTES
Assumed care of patient at 0700.  Report received at bedside. Patient is A&O x 4 with expressive aphasia and visual agnosia.  Speech consult ordered.  Patient otherwise stable.  Hourly rounding in place.

## 2018-06-18 NOTE — DISCHARGE PLANNING
Medical Social Work    Anticipated Discharge Disposition: Rehab?    Action: Per AM huddle, pt may be appropriate for rehab if SLP provides recommendation for that LOC. Per rounds, SLP order to be place and rehab order.  LSW notified that pt and family would like to discuss d/c. LSW discussed process of rehab evaluation at bedside. Pt requesting [facility] if he is to d/c to that LOC. Pt concerned about IRF cost. LSW explained that IRF will run insurance to provide accurate cost. Pt requesting transportation resources.      Barriers to Discharge: None     Plan: LSW to follow for rehab consult's recommendations and will follow up with transportation resources.

## 2018-06-18 NOTE — PROGRESS NOTES
Monitor Summary: SB-SR 59-96, [place] .16, QRS .08, QT .40 with rare PVC,couplet per strip from monitor room

## 2018-06-18 NOTE — CARE PLAN
Problem: Communication  Goal: The ability to communicate needs accurately and effectively will improve  Outcome: PROGRESSING SLOWER THAN EXPECTED  Patient still has difficulty with word finding, and he is not able to make sense of written words.    Problem: Psychosocial Needs:  Goal: Level of anxiety will decrease  Outcome: PROGRESSING AS EXPECTED  Patient is anxious regarding recovery time, but education provided has helped ease his anxiety

## 2018-06-19 ENCOUNTER — TELEPHONE (OUTPATIENT)
Dept: CARDIOLOGY | Facility: MEDICAL CENTER | Age: 68
End: 2018-06-19

## 2018-06-19 PROBLEM — I63.9 APHASIA DUE TO ACUTE CEREBROVASCULAR ACCIDENT (CVA) (HCC): Status: ACTIVE | Noted: 2018-06-18

## 2018-06-19 PROBLEM — R47.01 APHASIA DUE TO ACUTE CEREBROVASCULAR ACCIDENT (CVA) (HCC): Status: ACTIVE | Noted: 2018-06-18

## 2018-06-19 PROCEDURE — 770006 HCHG ROOM/CARE - MED/SURG/GYN SEMI*

## 2018-06-19 PROCEDURE — 700102 HCHG RX REV CODE 250 W/ 637 OVERRIDE(OP): Performed by: HOSPITALIST

## 2018-06-19 PROCEDURE — 96105 ASSESSMENT OF APHASIA: CPT

## 2018-06-19 PROCEDURE — G9163 LANG EXPRESS GOAL STATUS: HCPCS | Mod: CH

## 2018-06-19 PROCEDURE — 99232 SBSQ HOSP IP/OBS MODERATE 35: CPT | Performed by: HOSPITALIST

## 2018-06-19 PROCEDURE — G9162 LANG EXPRESS CURRENT STATUS: HCPCS | Mod: CJ

## 2018-06-19 PROCEDURE — 700111 HCHG RX REV CODE 636 W/ 250 OVERRIDE (IP): Performed by: HOSPITALIST

## 2018-06-19 PROCEDURE — A9270 NON-COVERED ITEM OR SERVICE: HCPCS | Performed by: HOSPITALIST

## 2018-06-19 RX ADMIN — SENNOSIDES AND DOCUSATE SODIUM 2 TABLET: 8.6; 5 TABLET ORAL at 20:43

## 2018-06-19 RX ADMIN — ENOXAPARIN SODIUM 40 MG: 100 INJECTION SUBCUTANEOUS at 08:25

## 2018-06-19 RX ADMIN — ASPIRIN 325 MG: 325 TABLET ORAL at 08:25

## 2018-06-19 RX ADMIN — ATORVASTATIN CALCIUM 80 MG: 80 TABLET, FILM COATED ORAL at 20:43

## 2018-06-19 ASSESSMENT — PAIN SCALES - GENERAL
PAINLEVEL_OUTOF10: 0

## 2018-06-19 ASSESSMENT — ENCOUNTER SYMPTOMS
FALLS: 0
ABDOMINAL PAIN: 0
FEVER: 0
NERVOUS/ANXIOUS: 0
DOUBLE VISION: 0
BACK PAIN: 0
BLURRED VISION: 0
HEARTBURN: 0
NAUSEA: 0
VOMITING: 0
SPEECH CHANGE: 1
FOCAL WEAKNESS: 1
PALPITATIONS: 0
CHILLS: 0
COUGH: 0
SHORTNESS OF BREATH: 0
DIZZINESS: 0
HEADACHES: 0

## 2018-06-19 ASSESSMENT — LIFESTYLE VARIABLES: SUBSTANCE_ABUSE: 0

## 2018-06-19 NOTE — CARE PLAN
Problem: Safety  Goal: Will remain free from injury  Outcome: PROGRESSING AS EXPECTED  Ambulate with walker stand by assist  Side rails up x 2 call light within reach.

## 2018-06-19 NOTE — TELEPHONE ENCOUNTER
Called and LM on Jason's (patient's son) voicemail to go over any questions he may have about the loop recorder (ILR).   [facility] - [phone]

## 2018-06-19 NOTE — PROGRESS NOTES
Monitor Summary: SR 60-93, NJ .20, QRS .08, QT .36 with occasional PVC & rare couplet per strip from monitor room

## 2018-06-19 NOTE — PROGRESS NOTES
[facility] Hospitalist Progress Note    Date of Service: 2018    Chief Complaint  68 y.o. male transferred 2018 from [place] where imaging showed LMCA M1 distal segment occlusion. Symptoms began about 26 hours prior to presentation. No IR procedure recommended upon transfer.    Interval Problem Update  - Wernicke's aphasia - SLP consulted  - LMCA distial M1 segment occlusion - not a thrombectomy candidate.  - VSS no acute distress  - Likely cardioembolic source. May benefit from VENICE/ILR    Consultants/Specialty  - IR  - Neurology  - Cardiology - will arrange outpatient follow up    Disposition  Rehab vs SNF.         Review of Systems   Constitutional: Negative for chills and fever.   HENT: Negative for ear discharge and ear pain.    Eyes: Negative for blurred vision and double vision.   Respiratory: Negative for cough and shortness of breath.    Cardiovascular: Negative for chest pain and palpitations.   Gastrointestinal: Negative for abdominal pain, heartburn, nausea and vomiting.   Genitourinary: Negative for dysuria, frequency and urgency.   Musculoskeletal: Negative for back pain, falls and joint pain.   Skin: Negative for rash.   Neurological: Positive for speech change and focal weakness. Negative for dizziness and headaches.   Psychiatric/Behavioral: Negative for substance abuse. The patient is not nervous/anxious.       Physical Exam  Laboratory/Imaging   Hemodynamics  Temp (24hrs), Av.9 °C (98.4 °F), Min:36.4 °C (97.5 °F), Max:37.2 °C (98.9 °F)   Temperature: 36.4 °C (97.5 °F)  Pulse  Av.4  Min: 52  Max: 87    Blood Pressure : 125/85      Respiratory      Respiration: 15, Pulse Oximetry: 96 %             Fluids  No intake or output data in the 24 hours ending 18 1002    Nutrition  Orders Placed This Encounter   Procedures   • DIET ORDER     Standing Status:   Standing     Number of Occurrences:   1     Order Specific Question:   Diet:     Answer:   Regular [1]     Physical Exam    Constitutional: Vital signs are normal. He is cooperative. He appears ill. No distress.   HENT:   Head: Normocephalic.   Right Ear: Hearing normal.   Left Ear: Hearing normal.   Nose: Nose normal.   Mouth/Throat: Oropharynx is clear and moist and mucous membranes are normal.   Eyes: Conjunctivae, EOM and lids are normal. No scleral icterus.   Neck: Phonation normal. No JVD present.   Cardiovascular: Normal rate, regular rhythm, normal heart sounds and normal pulses.    No edema     Pulmonary/Chest: Effort normal. No apnea. He has no rales.   Abdominal: Soft. Normal appearance and bowel sounds are normal. There is no tenderness. There is no rebound.   Musculoskeletal: Normal range of motion.   Neurological: He is alert. He has normal strength. GCS eye subscore is 4. GCS verbal subscore is 4. GCS motor subscore is 6.   All extremities 5/5   Skin: Skin is warm and dry.   Psychiatric: He has a normal mood and affect. Judgment normal.   Nursing note and vitals reviewed.      Recent Labs      06/17/18   0155  06/18/18   0531   WBC  8.1  8.6   RBC  4.90  4.87   HEMOGLOBIN  14.7  14.4   HEMATOCRIT  43.7  43.9   MCV  89.2  90.1   MCH  30.0  29.6   MCHC  33.6*  32.8*   RDW  43.7  43.8   PLATELETCT  167  168   MPV  10.1  10.3     Recent Labs      06/17/18   0155  06/18/18   0531   SODIUM  140  139   POTASSIUM  3.6  3.8   CHLORIDE  110  111   CO2  22  22   GLUCOSE  112*  97   BUN  17  12   CREATININE  0.78  0.62   CALCIUM  9.0  8.4*     Recent Labs      06/17/18   0155   APTT  30.3   INR  1.03         Recent Labs      06/18/18   0531   TRIGLYCERIDE  97   HDL  31*   LDL  162*          Assessment/Plan     * Acute CVA (cerebrovascular accident) (HCC)   Assessment & Plan    - occlusion of Left MCA  - ECHO unremarkable - no clear bubble study - may benefit from VENICE  - RUE numbness resolved. Continues with RUE paresis, alexia, aphasia  - SLP consult pending  - PT/OT - possible candidate for [facility]   - ASA and statin  -  telemetry - has shown no events   - neuro checks  - Discussed w cards who will follow up as OP for ILR possible VENICE        Aphasia due to acute cerebrovascular accident (CVA) ([facility])   Assessment & Plan    - as above  - SLP suggests SNF placement - which is available in [facility]        Hyperlipidemia   Assessment & Plan    - continue statin        Essential hypertension   Assessment & Plan    - not currently on any medication  - several days out from acute CVA -- period of permissive HTN to wean and start BP control PRN            Quality-Core Measures   Reviewed items::  Labs reviewed, Medications reviewed and Radiology images reviewed  Gentile catheter::  No Gentile  DVT prophylaxis pharmacological::  Enoxaparin (Lovenox)  DVT prophylaxis - mechanical:  SCDs  Ulcer Prophylaxis::  Not indicated  Assessed for rehabilitation services:  Patient was assess for and/or received rehabilitation services during this hospitalization      RICKY Ojeda.

## 2018-06-19 NOTE — PROGRESS NOTES
Family is at bedside.  Requesting information consistently.  Family expressing concern that the patient may not be fully understanding all of the information being given to him.  Advised family that it would be better to have another person present during consultations to ensure that information is being related appropriately.

## 2018-06-19 NOTE — CARE PLAN
Problem: Communication  Goal: The ability to communicate needs accurately and effectively will improve  Outcome: PROGRESSING AS EXPECTED  Patient is able to express needs effectively.  Still has some word-finding difficulties, but when given time, is able to find the correct words.    Problem: Bowel/Gastric:  Goal: Normal bowel function is maintained or improved  Outcome: PROGRESSING SLOWER THAN EXPECTED  Patient has not had a BM since admit and is refusing stool softeners.  Education provided.

## 2018-06-19 NOTE — DISCHARGE PLANNING
TCC order from Lorene CHAN    New stroke PT note indicates no further skilled PT need, OT note indicates ideal candidate for IRF level of care. SW note indicates SLP evaluation pending. Family choice to obtain post acute services in [place]. No inpatient rehabilitation facility in [place]. Will follow for SLP recommendation  to determine CMS criteria for IRF No physiatry consult ordered per protocol.  I will monitor.

## 2018-06-19 NOTE — PROGRESS NOTES
Pt AOx4 with expressive aphagia. Rested during the night cardiac monotor in place NSR . Ambulated to bathroom with FWW standby assist. Side rails up x 2 call light within reach and hourly rounding in place.

## 2018-06-19 NOTE — PROGRESS NOTES
Assumed care of patient at 0700.  Report received at bedside.  Patient is A&O x 4 with expressive aphasia and agnosia.  Up self and steady.  Calls appropriately for assistance.  Denies pain.  Hourly rounding in place.

## 2018-06-19 NOTE — THERAPY
"Speech Language Therapy Evaluation completed to address communication.    Functional Status:  The Western Aphasia Battery was administered.  Pt presents with a non-fluent type aphasia, characterized by mostly complete sentences with occasional hesitation and use of paraphasias, as well as word finding difficulty and some articulation errors.  Pt's auditory comprehension appears to be stronger than his verbal expression, however he is able to make his wants, needs and opinions knows using compensatory word finding strategies.  Pt presents with min to mod word finding difficulty in conversation, and independently uses circumlocution to compensate.  He has moderate deficits naming pictures, however was able to name objects, body parts and colors with 100% accuracy.  Pt has minimal deficits answering complex yes/no questions and following abstract directions. He has severe deficits with reading comprehension.  His accuracy identifying letters is only 50%, and pt is unable to read simple words, or match pictures to words.  Pt has good awareness of his deficits and is motivated to improve.      Recommendations:  1) Pt will benefit from post acute SLP services to address aphasia and to assist with return to OF.  2) SLP is following in the acute care setting.      Plan of Care: Will benefit from Speech Therapy 5 times per week    Post-Acute Therapy: Discharge to a transitional care facility for continued skilled therapy services OR to home with additional outpatient therapy     See \"Rehab Therapy-Acute\" Patient Summary Report for complete documentation. Thank you for the consult.   "

## 2018-06-20 ENCOUNTER — HOSPITAL ENCOUNTER (INPATIENT)
Facility: REHABILITATION | Age: 68
LOS: 8 days | DRG: 057 | End: 2018-06-28
Attending: PHYSICAL MEDICINE & REHABILITATION | Admitting: PHYSICAL MEDICINE & REHABILITATION
Payer: MEDICARE

## 2018-06-20 ENCOUNTER — RESOLUTE PROFESSIONAL BILLING HOSPITAL PROF FEE (OUTPATIENT)
Dept: PHYSICAL MEDICINE AND REHAB | Facility: REHABILITATION | Age: 68
End: 2018-06-20
Payer: MEDICARE

## 2018-06-20 VITALS
SYSTOLIC BLOOD PRESSURE: 134 MMHG | OXYGEN SATURATION: 94 % | HEART RATE: 74 BPM | DIASTOLIC BLOOD PRESSURE: 82 MMHG | WEIGHT: 180 LBS | RESPIRATION RATE: 17 BRPM | TEMPERATURE: 97 F | BODY MASS INDEX: 24.38 KG/M2 | HEIGHT: 72 IN

## 2018-06-20 PROCEDURE — 0JH632Z INSERTION OF MONITORING DEVICE INTO CHEST SUBCUTANEOUS TISSUE AND FASCIA, PERCUTANEOUS APPROACH: ICD-10-PCS | Performed by: INTERNAL MEDICINE

## 2018-06-20 PROCEDURE — 700101 HCHG RX REV CODE 250

## 2018-06-20 PROCEDURE — C1764 EVENT RECORDER, CARDIAC: HCPCS

## 2018-06-20 PROCEDURE — 700102 HCHG RX REV CODE 250 W/ 637 OVERRIDE(OP): Performed by: PHYSICAL MEDICINE & REHABILITATION

## 2018-06-20 PROCEDURE — 700102 HCHG RX REV CODE 250 W/ 637 OVERRIDE(OP): Performed by: HOSPITALIST

## 2018-06-20 PROCEDURE — A9270 NON-COVERED ITEM OR SERVICE: HCPCS | Performed by: PHYSICAL MEDICINE & REHABILITATION

## 2018-06-20 PROCEDURE — 33282 HCHG CARDIAC LR INSERTION: CPT

## 2018-06-20 PROCEDURE — 302736 HCHG ADHESIVE DERMABOND

## 2018-06-20 PROCEDURE — 99239 HOSP IP/OBS DSCHRG MGMT >30: CPT | Performed by: HOSPITALIST

## 2018-06-20 PROCEDURE — A9270 NON-COVERED ITEM OR SERVICE: HCPCS | Performed by: HOSPITALIST

## 2018-06-20 PROCEDURE — 770010 HCHG ROOM/CARE - REHAB SEMI PRIVAT*

## 2018-06-20 PROCEDURE — 94760 N-INVAS EAR/PLS OXIMETRY 1: CPT

## 2018-06-20 PROCEDURE — 700112 HCHG RX REV CODE 229: Performed by: PHYSICAL MEDICINE & REHABILITATION

## 2018-06-20 PROCEDURE — 99223 1ST HOSP IP/OBS HIGH 75: CPT | Mod: AI | Performed by: PHYSICAL MEDICINE & REHABILITATION

## 2018-06-20 PROCEDURE — 700111 HCHG RX REV CODE 636 W/ 250 OVERRIDE (IP): Performed by: HOSPITALIST

## 2018-06-20 PROCEDURE — 99406 BEHAV CHNG SMOKING 3-10 MIN: CPT

## 2018-06-20 RX ORDER — ASPIRIN 325 MG
325 TABLET ORAL DAILY
Status: DISCONTINUED | OUTPATIENT
Start: 2018-06-20 | End: 2018-06-28 | Stop reason: HOSPADM

## 2018-06-20 RX ORDER — ATORVASTATIN CALCIUM 80 MG/1
80 TABLET, FILM COATED ORAL EVERY EVENING
Qty: 30 TAB | Status: ON HOLD
Start: 2018-06-20 | End: 2018-06-27

## 2018-06-20 RX ORDER — BISACODYL 10 MG
10 SUPPOSITORY, RECTAL RECTAL
Status: DISCONTINUED | OUTPATIENT
Start: 2018-06-20 | End: 2018-06-28 | Stop reason: HOSPADM

## 2018-06-20 RX ORDER — POLYVINYL ALCOHOL 14 MG/ML
1 SOLUTION/ DROPS OPHTHALMIC PRN
Status: DISCONTINUED | OUTPATIENT
Start: 2018-06-20 | End: 2018-06-28 | Stop reason: HOSPADM

## 2018-06-20 RX ORDER — ASPIRIN 325 MG
325 TABLET ORAL DAILY
Qty: 100 TAB | Status: ON HOLD
Start: 2018-06-21 | End: 2018-06-27

## 2018-06-20 RX ORDER — TRAZODONE HYDROCHLORIDE 50 MG/1
50 TABLET ORAL
Status: DISCONTINUED | OUTPATIENT
Start: 2018-06-20 | End: 2018-06-28 | Stop reason: HOSPADM

## 2018-06-20 RX ORDER — ALUMINA, MAGNESIA, AND SIMETHICONE 2400; 2400; 240 MG/30ML; MG/30ML; MG/30ML
20 SUSPENSION ORAL
Status: DISCONTINUED | OUTPATIENT
Start: 2018-06-20 | End: 2018-06-28 | Stop reason: HOSPADM

## 2018-06-20 RX ORDER — ACETAMINOPHEN 325 MG/1
650 TABLET ORAL EVERY 4 HOURS PRN
Status: DISCONTINUED | OUTPATIENT
Start: 2018-06-20 | End: 2018-06-28 | Stop reason: HOSPADM

## 2018-06-20 RX ORDER — LACTULOSE 20 G/30ML
30 SOLUTION ORAL
Status: DISCONTINUED | OUTPATIENT
Start: 2018-06-20 | End: 2018-06-28 | Stop reason: HOSPADM

## 2018-06-20 RX ORDER — ONDANSETRON 2 MG/ML
4 INJECTION INTRAMUSCULAR; INTRAVENOUS 4 TIMES DAILY PRN
Status: DISCONTINUED | OUTPATIENT
Start: 2018-06-20 | End: 2018-06-28 | Stop reason: HOSPADM

## 2018-06-20 RX ORDER — ACETAMINOPHEN 325 MG/1
650 TABLET ORAL EVERY 6 HOURS PRN
Qty: 30 TAB | Refills: 0 | Status: ON HOLD | OUTPATIENT
Start: 2018-06-20 | End: 2018-06-27

## 2018-06-20 RX ORDER — ONDANSETRON 4 MG/1
4 TABLET, ORALLY DISINTEGRATING ORAL 4 TIMES DAILY PRN
Status: DISCONTINUED | OUTPATIENT
Start: 2018-06-20 | End: 2018-06-28 | Stop reason: HOSPADM

## 2018-06-20 RX ORDER — ECHINACEA PURPUREA EXTRACT 125 MG
2 TABLET ORAL PRN
Status: DISCONTINUED | OUTPATIENT
Start: 2018-06-20 | End: 2018-06-28 | Stop reason: HOSPADM

## 2018-06-20 RX ORDER — ONDANSETRON 4 MG/1
4 TABLET, ORALLY DISINTEGRATING ORAL EVERY 4 HOURS PRN
Qty: 10 TAB | Refills: 0 | Status: ON HOLD | OUTPATIENT
Start: 2018-06-20 | End: 2018-06-27

## 2018-06-20 RX ORDER — ATORVASTATIN CALCIUM 80 MG/1
80 TABLET, FILM COATED ORAL EVERY EVENING
Status: CANCELLED | OUTPATIENT
Start: 2018-06-20

## 2018-06-20 RX ORDER — AMOXICILLIN 250 MG
2 CAPSULE ORAL 2 TIMES DAILY
Qty: 30 TAB | Refills: 0 | Status: ON HOLD | OUTPATIENT
Start: 2018-06-20 | End: 2018-06-27

## 2018-06-20 RX ORDER — DOCUSATE SODIUM 100 MG/1
100 CAPSULE, LIQUID FILLED ORAL DAILY
Status: DISCONTINUED | OUTPATIENT
Start: 2018-06-20 | End: 2018-06-28 | Stop reason: HOSPADM

## 2018-06-20 RX ORDER — AMOXICILLIN 250 MG
1 CAPSULE ORAL
Status: DISCONTINUED | OUTPATIENT
Start: 2018-06-20 | End: 2018-06-28 | Stop reason: HOSPADM

## 2018-06-20 RX ORDER — HYDRALAZINE HYDROCHLORIDE 25 MG/1
25 TABLET, FILM COATED ORAL EVERY 8 HOURS PRN
Status: DISCONTINUED | OUTPATIENT
Start: 2018-06-20 | End: 2018-06-28 | Stop reason: HOSPADM

## 2018-06-20 RX ORDER — LIDOCAINE HYDROCHLORIDE 20 MG/ML
INJECTION, SOLUTION INFILTRATION; PERINEURAL
Status: DISCONTINUED
Start: 2018-06-20 | End: 2018-06-20 | Stop reason: HOSPADM

## 2018-06-20 RX ADMIN — ASPIRIN 325 MG ORAL TABLET 325 MG: 325 PILL ORAL at 16:23

## 2018-06-20 RX ADMIN — DOCUSATE SODIUM 100 MG: 100 CAPSULE, LIQUID FILLED ORAL at 16:22

## 2018-06-20 RX ADMIN — ENOXAPARIN SODIUM 40 MG: 100 INJECTION SUBCUTANEOUS at 09:24

## 2018-06-20 RX ADMIN — ASPIRIN 325 MG: 325 TABLET ORAL at 09:24

## 2018-06-20 RX ADMIN — SENNOSIDES AND DOCUSATE SODIUM 2 TABLET: 8.6; 5 TABLET ORAL at 09:24

## 2018-06-20 ASSESSMENT — PATIENT HEALTH QUESTIONNAIRE - PHQ9
2. FEELING DOWN, DEPRESSED, IRRITABLE, OR HOPELESS: NOT AT ALL
2. FEELING DOWN, DEPRESSED, IRRITABLE, OR HOPELESS: NOT AT ALL
SUM OF ALL RESPONSES TO PHQ9 QUESTIONS 1 AND 2: 0
1. LITTLE INTEREST OR PLEASURE IN DOING THINGS: NOT AT ALL
1. LITTLE INTEREST OR PLEASURE IN DOING THINGS: NOT AT ALL
SUM OF ALL RESPONSES TO PHQ9 QUESTIONS 1 AND 2: 0

## 2018-06-20 ASSESSMENT — PAIN SCALES - GENERAL
PAINLEVEL_OUTOF10: 0

## 2018-06-20 ASSESSMENT — LIFESTYLE VARIABLES
ALCOHOL_USE: NO
EVER_SMOKED: YES

## 2018-06-20 NOTE — DISCHARGE PLANNING
TCC spoke with Cath [facility] and the loop mrecorder will be complete by 1310.  Dr. Cedillo has accepted.  ROCIO Paulson has medically cleared.  Transport has been arranged for 1630.  Ms left for [place], BILLY [id number].

## 2018-06-20 NOTE — DISCHARGE INSTRUCTIONS
Discharge Instructions    Discharged to other by medical transportation with escort. Discharged via wheelchair, hospital escort: Yes.  Special equipment needed: Not Applicable    Be sure to schedule a follow-up appointment with your primary care doctor or any specialists as instructed.     Discharge Plan:   Diet Plan: Discussed  Activity Level: Discussed  Smoking Cessation Offered: Patient Refused  Confirmed Follow up Appointment: Appointment Scheduled  Confirmed Symptoms Management: Discussed  Medication Reconciliation Updated: Yes  Pneumococcal Vaccine Administered/Refused: Not given - Patient refused pneumococcal vaccine  Influenza Vaccine Indication: Patient Refuses    I understand that a diet low in cholesterol, fat, and sodium is recommended for good health. Unless I have been given specific instructions below for another diet, I accept this instruction as my diet prescription.   Other diet: Regular    Special Instructions:     Stroke/CVA/TIA/Hemorrhagic Ischemia Discharge Instructions  You have had a stroke. Your risk factors have been identified as follows:  Age - Over 55  Gender - Men are at a higher risk than women  High blood pressure  It is important that you reduce your risk factors to avoid another stroke in the future. Here are some general guidelines to follow:  · Eat healthy - avoid food high in fat.  · Get regular exercise.  · Maintain a healthy weight.  · Avoid smoking.  · Avoid alcohol and illegal drug use.  · Take your medications as directed.  For more information regarding risk factors, refer to pages 17-19 in your Stroke Patient Education Guide. Stroke Education Guide was given to patient.    Warning signs of a stroke include (which can also be found on page 3 of your Stroke Patient Education Guide):  · Sudden numbness of weakness of the face, arm or leg (especially on one side of the body).  · Sudden confusion, trouble speaking or understanding.  · Sudden trouble seeing in one or both  eyes.  · Sudden trouble walking, dizziness, loss of balance or coordination.  · Sudden severe headache with no known cause.  It is very important to get treatment quickly when a stroke occurs. If you experience any of the above warning signs, call [phone] immediately.     Some patients who have had a stroke will be going home on a blood thinner medication called Warfarin (Coumadin).  This medication requires very close monitoring and follow up.  This follow up can be provided by either your Primary Care Physician or by [facility] Outpatient Anticoagulation Service.  The Outpatient Anticoagulation Service is located at the [facility] at [facility] ([facility]).  If you do not know when your follow up appointment is scheduled, call [phone] to verify your appointment time.      · Is patient discharged on Warfarin / Coumadin?   No     Depression / Suicide Risk    As you are discharged from this [facility], it is important to learn how to keep safe from harming yourself.    Recognize the warning signs:  · Abrupt changes in personality, positive or negative- including increase in energy   · Giving away possessions  · Change in eating patterns- significant weight changes-  positive or negative  · Change in sleeping patterns- unable to sleep or sleeping all the time   · Unwillingness or inability to communicate  · Depression  · Unusual sadness, discouragement and loneliness  · Talk of wanting to die  · Neglect of personal appearance   · Rebelliousness- reckless behavior  · Withdrawal from people/activities they love  · Confusion- inability to concentrate     If you or a loved one observes any of these behaviors or has concerns about self-harm, here's what you can do:  · Talk about it- your feelings and reasons for harming yourself  · Remove any means that you might use to hurt yourself (examples: pills, rope, extension cords, firearm)  · Get professional help  from the community (Mental Health, Substance Abuse, psychological counseling)  · Do not be alone:Call your Safe Contact- someone whom you trust who will be there for you.  · Call your local CRISIS HOTLINE 196-5629 or 934-304-4306  · Call your local Children's Mobile Crisis Response Team [place] (558) 892-9109 or www.[employer]  · Call the toll free National Suicide Prevention Hotlines   · National Suicide Prevention Lifeline 366-855-YHQH (4316)  · National Hope Line Network 800-SUICIDE (747-7383)

## 2018-06-20 NOTE — CARE PLAN
Problem: Infection  Goal: Will remain free from infection  Outcome: PROGRESSING AS EXPECTED  Patient remains free from signs of infection    Problem: Venous Thromboembolism (VTW)/Deep Vein Thrombosis (DVT) Prevention:  Goal: Patient will participate in Venous Thrombosis (VTE)/Deep Vein Thrombosis (DVT)Prevention Measures  Outcome: PROGRESSING SLOWER THAN EXPECTED  Patient accepts Lovenox shots, but refuses SCD's and ambulating in the hallway.  Education provided.

## 2018-06-20 NOTE — FLOWSHEET NOTE
06/20/18 1535   Type of Assessment   Assessment Yes   Patient History   Pulmonary Diagnosis pt denies   Home O2 No   Home Treatments/Frequency No   COPD Risk Screening   Do you have a history of COPD? No   Smoking History   Have you ever smoked Yes   Have you smoked in the last 12 months Yes   Have you quit smoking No   Smoking Cessation Offered Patient Counseled   #(RT ONLY) Smoking and tobacco use cessation counseling 3-10 minutes smart text complete?  Asymptomatic   Level Of Consciousness   Level of Consciousness Alert   Respiratory WDL   Respiratory (WDL) X   Chest Exam   Respiration 18   Pulse 78   Breath Sounds   RML Breath Sounds Diminished   RLL Breath Sounds Diminished   LLL Breath Sounds Diminished   Oximetry   #Pulse Oximetry (Single Determination) Yes   Oxygen   Home O2 Use Prior To Admission? No   Pulse Oximetry 95 %   O2 Daily Delivery Respiratory  Room Air with O2 Available

## 2018-06-20 NOTE — PROGRESS NOTES
Patient admitted with aphasia and right sided weakness.  Per Dr Reeves consultative note:  HPI:  Patient is a 69 yo WM. He is from [place]. On Friday evening he was in his usual state of health when he had sudden onset aphasia. This was associated with R sided weakness. This did not get better, so about a day later he presented to the hospital. At that time, he apparently had quite a bit of difficulty with speech. Work up here on MRI showed chronic ischemic changes, occlusion of distal left M1, and then multifocal acute infarcts including L frontal, parietal, posterior temporal, lateral occipital lobes, and L sided basal ganglia. Echo unrevealing. Rhythm monitoring here has been sinus. EP asked to consider ILR to look for subclinical AF.      Impression/Plan:  1)Cryptogenic stroke     -MR findings certainly compatible with cardiogenic source of thromboembolism  -I think ILR is very reasonable  -I explained the procedure to him, including risks/benefits and he is hesitant about proceeding, wants to talk to his family first  -I think if he decides he wants it done we can do it here or if he decides after discharge, he can see me for follow-up and get it done    Patient had a demi implanted in his leg in the past with reaction of itching.  Questionable titanium allergy.   This issue was discussed with patient and family at bedside. Device is implanted into SQ fat if redness occurs would explant device.  Patient verbalized understanding .  Patient agreeable to implantation of ILR this AM.  Consent ordered.

## 2018-06-20 NOTE — PROGRESS NOTES
14:45, Pt arrived at [facility] from [facility]  via wheel chair,  to follow for diagnosis of L Stroke. Initial assessment initiated. Pt oriented to room and facility routine and safety measures; skin checked by 2 RN: Cindi. Pt education binder provided and discussed. Pt A/O x 4, continent of bowel and bladder. SBA  for transfers. Pt denied any pain or discomfort at this time. Pt positioned for comfort in bed. Call light within reach, safety measures in place. Will continue to monitor.

## 2018-06-20 NOTE — PREADMISSION SCREENING NOTE
Pre-Admission Screening Form    Patient Information:   Name: Cortez Mari     MRN: [id number]       : 1950      Age: 68 y.o.   Gender: male      Race: White [7]       Marital Status:  [4]  Family Contact: Jason Mari  CHANDRAEDGARDZACK        Relationship: Son [15]  Friend [5]  Home Phone: [phone]           Cell Phone:     Advanced Directives: None  Code Status:  FULL  Current Attending Provider: Deo Raymond M.D.  Referring Physician: ROCIO Paulson   Physiatrist Consult: Dr. Cedillo    Referral Date: 18  Primary Payor Source:  MEDICARE  Secondary Payor Source:      Medical Information:   Date of Admission to [facility]  Room Number: S197/01  Rehabilitation Diagnosis: 01.2 (R) Body Involvement (L) Brain     There is no immunization history on file for this patient.  No Known Allergies  Past Medical History:   Diagnosis Date   • Smoker      Past Surgical History:   Procedure Laterality Date   • INGUINAL HERNIA REPAIR  2011    Performed by KELLY CHIN at SURGERY SAME DAY [facility] ORS   • OTHER      eye       History Leading to Admission, Conditions that Caused the Need for Rehab (CMS):     Christian Kidd M.D. Physician Signed Hospital Medicine  H&P Date of Service: 2018  2:54 AM      Expand All Collapse All    []Hide copied text  []Hover for attribution information   Hospital Medicine History and Physical     Date of Service  2018     Chief Complaint      Chief Complaint   Patient presents with   • Possible Stroke         History of Presenting Illness  68 y.o. male with no reported medical history was in his usual state of health until the day prior to admission, approximately 26 hours before this evaluation when he began to have numbness and weakness in his right hand, associated with difficulty speaking correctly.  He reports that these symptoms increased, and he presented to outside facility for evaluation.  After imaging  showed an M1 segment defect, patient was transferred to this facility for possible thrombectomy.  He currently denies headache or vision changes.  No fever or chills, no other complaints.  No exacerbating or alleviating factors.          Primary Care Physician  Pcp Pt States None     Consultants  none     Code Status  Full code      Review of Systems  Review of Systems   Constitutional: Negative.    HENT: Negative.    Eyes: Negative.    Respiratory: Negative.    Cardiovascular: Negative.    Gastrointestinal: Negative.    Genitourinary: Negative.    Musculoskeletal: Negative.    Skin: Negative.    Neurological: Positive for tingling, sensory change, speech change and focal weakness.   Endo/Heme/Allergies: Negative.    Psychiatric/Behavioral: Negative.           Past Medical History       Past Medical History:   Diagnosis Date   • Smoker           Surgical History        Past Surgical History:   Procedure Laterality Date   • INGUINAL HERNIA REPAIR   2011     Performed by KELLY CHIN at SURGERY SAME DAY [facility] ORS   • OTHER         eye         Medications  No current facility-administered medications on file prior to encounter.       No current outpatient prescriptions on file prior to encounter.          Family History  Family History   History reviewed. No pertinent family history.        Social History          Social History   Substance Use Topics   • Smoking status: Current Every Day Smoker       Packs/day: 1.00       Types: Cigarettes   • Smokeless tobacco: Never Used         Comment: 0.5ppd x35 yrs   • Alcohol use No         Allergies  No Known Allergies      Physical Exam   Laboratory   Hemodynamics  Temp (24hrs), Av.8 °C (98.3 °F), Min:36.8 °C (98.3 °F), Max:36.8 °C (98.3 °F)   Temperature: 36.8 °C (98.3 °F)  Pulse  Av.7  Min: 52  Max: 83 Heart Rate (Monitored): 73  NIBP: (!) 162/105       Respiratory      Respiration: 19, Pulse Oximetry: 96 %     Physical Exam   Constitutional: He is  oriented to person, place, and time. He appears well-developed and well-nourished. No distress.   HENT:   Head: Normocephalic.   Eyes: Pupils are equal, round, and reactive to light.   Neck: Normal range of motion. Neck supple.   Cardiovascular: Normal rate, regular rhythm and normal heart sounds.  Exam reveals no gallop and no friction rub.    No murmur heard.  Pulmonary/Chest: Effort normal and breath sounds normal. No respiratory distress. He has no wheezes.   Abdominal: Soft. Bowel sounds are normal. He exhibits no distension and no mass. There is tenderness. There is no rebound and no guarding.   Musculoskeletal: Normal range of motion. He exhibits no edema.   Decreased strength to right arm and hand, numbness, with acute M1 thrombus and acute MRI.      Neurological: He is alert and oriented to person, place, and time. No cranial nerve deficit.   Skin: He is not diaphoretic.   Nursing note and vitals reviewed.             Recent Labs      06/17/18   0155   WBC  8.1   RBC  4.90   HEMOGLOBIN  14.7   HEMATOCRIT  43.7   MCV  89.2   MCH  30.0   MCHC  33.6*   RDW  43.7   PLATELETCT  167   MPV  10.1          Recent Labs      06/17/18   0155   SODIUM  140   POTASSIUM  3.6   CHLORIDE  110   CO2  22   GLUCOSE  112*   BUN  17   CREATININE  0.78   CALCIUM  9.0          Recent Labs      06/17/18   0155   GLUCOSE  112*          Recent Labs      06/17/18   0155   APTT  30.3   INR  1.03                    Lab Results   Component Value Date     TROPONINI <0.01 06/17/2018      Urinalysis:  No results found for: SPECGRAVITY, GLUCOSEUR, KETONES, NITRITE, WBCURINE, RBCURINE, BACTERIA, EPITHELCELL      Imaging  CTA head from [facility] shows M1 occlusion on left      MRI with acute ischemic event noted   Assessment/Plan       I anticipate this patient will require at least two midnights for appropriate medical management, necessitating inpatient admission.         * Acute CVA (cerebrovascular accident) (HCC)   Assessment & Plan     With  right hand and arm numbness, poor coordination, and Broca's aphasia.  Outside of window for TPA or thrombectomy despite clot in M1 segment.  Plan for close monitoring, ASA, PT and OT, speech evaluation.            Essential hypertension   Assessment & Plan     Not currently on medication regimen.  Will allow permissive hypertension given CVA.                 VTE prophylaxis: SCD, lovenox.                   Xavi Cedillo M.D. Physician Signed Physical Medicine & Rehab  Consults Date of Service: 6/20/2018 10:44 AM   Consult Orders:   IP CONSULT FOR PHYSIATRY [[id number]] ordered by CEASAR Ojeda at 06/20/18 1014      Expand All Collapse All    []Hide copied text  []Hover for attribution information  Medical chart review completed on 6/20/2018     Patient is a 68 y.o. year-old male with a past medical history significant for nicotine dependence admitted to [facility] on 6/16/2018 11:59 PM who initially presented to [facility] with over a day of numbness and weakness in the right hand with associated language deficits. The patient reported those symptoms gradually increased and he presented to the hospital for further evaluation.     MRI on June 17 showed occlusion of the distal left M1 with multifocal acute infarctions in the left frontal, parietal, temporal, and occipital lobes. MRA on June 17 showed occlusion of the distal left M1. He was outside of the window for thrombectomy/TPA.     Echocardiogram on June 18 showed normal left ventricular function and an ejection fraction of 65%. He had elevated cholesterol but a normal hemoglobin A1c at 5.6. EKG and monitoring showed normal sinus rhythm.     He was seen by neurology on June 17 and started on a statin, and aspirin.     Cardiology was consulted for assessment of implantable loop recorder due to the presumed cardioembolic etiology of the stroke.     He was allowed a period of permissive hypertension.  Blood pressure today is 124/55  mmHg     6 click scores are 24 for mobility and 17 for ADLs. He was evaluated by physical therapy on June 17 and required supervision for ambulation to 1000 feet. He was evaluated by occupational therapy on June 17 and required moderate assistance for toileting and bathing. He was evaluated by speech language pathology on June 19 and had nonfluent aphasia.        PMH:  Past Medical History        Past Medical History:   Diagnosis Date   • Smoker              PSH:  Past Surgical History         Past Surgical History:   Procedure Laterality Date   • INGUINAL HERNIA REPAIR   2/14/2011     Performed by KELLY CHIN at SURGERY SAME DAY [facility] ORS   • OTHER         eye            MEDICATIONS:       Current Facility-Administered Medications   Medication Dose   • enoxaparin (LOVENOX) inj 40 mg  40 mg   • acetaminophen (TYLENOL) tablet 650 mg  650 mg   • ondansetron (ZOFRAN) syringe/vial injection 4 mg  4 mg   • ondansetron (ZOFRAN ODT) dispertab 4 mg  4 mg   • senna-docusate (PERICOLACE or SENOKOT S) 8.6-50 MG per tablet 2 Tab  2 Tab     And   • polyethylene glycol/lytes (MIRALAX) PACKET 1 Packet  1 Packet     And   • magnesium hydroxide (MILK OF MAGNESIA) suspension 30 mL  30 mL     And   • bisacodyl (DULCOLAX) suppository 10 mg  10 mg   • atorvastatin (LIPITOR) tablet 80 mg  80 mg   • labetalol (NORMODYNE,TRANDATE) injection 10 mg  10 mg     Or   • hydrALAZINE (APRESOLINE) injection 10 mg  10 mg   • aspirin (ASA) tablet 325 mg  325 mg     Or   • aspirin (ASA) chewable tab 324 mg  324 mg     Or   • aspirin (ASA) suppository 300 mg  300 mg         ALLERGIES:  Patient has no known allergies.     PSYCHOSOCIAL HISTORY:  He lives in a one-story home with his significant other.        The patient presents with cognition, self cares and speech deficits and Minimal  de-conditioning. Pre-morbidly, this patient lived in a single level home with None steps to enter. The patient was evaluated by acute care Physical Therapy,  Occupational Therapy and Speech Language Pathology; currently requiring supervision assistance for mobility and moderate assistance for ADLs, also with ongoing speech deficits. The patient's current diet is Regular with Thin liquids.         DISCUSSION AND RECOMMENDATIONS:  The patient is a 68-year-old male with suspected cardioembolic stroke     The patient is a good candidate for an acute inpatient rehabilitation program with a coordinated program of care at an intensity and frequency not available at a lower level of care.       Based on the current documentation of his functional level, the patient would be best served by admission to an inpatient rehabilitation hospital. He would need supervision support at discharge.     Note: if the patient continues to improve while waiting for medical clearance/insurance authorization, and no longer requires 2 of of 3 therapy services (PT/OT/SLP), the patient will no longer meet criteria for acute inpatient rehabilitation.           This recommendation is substantiated by the patient's current medical condition with intervention and assessment of medical issues requiring an acute level of care for patient's safety and maximum outcome. A coordinated program of care will be provided by an interdisciplinary team including occupational therapy, speech language pathology, physiatry, rehab nursing and rehab psychology. Rehab goals include improved cognition and speech, mobility, self-care management, strength and conditioning/endurance, pain management, bowel and bladder management, mood and affect, and safety with independent home management including caregiver training.      Estimated length of stay is approximately 14 days. Rehab potential: Very Good. Disposition: to pre-morbid independent living setting with supportive care of patient's significant other. We will continue to follow with you in anticipation of discharge to acute inpatient rehabilitation when medically stable  to do so at the discretion of him attending physician. Thank you for the consultation. Please call with any questions regarding this recommendation.     Xavi Cedillo M.D.  Spinal Cord Injury Medicine  6/20/2018           Xavi Cedillo M.D. Physician Signed Physical Medicine & Rehab  Consults Date of Service: 6/20/2018 10:44 AM   Consult Orders:   IP CONSULT FOR PHYSIATRY [[id number]] ordered by CEASAR Ojeda at 06/20/18 1014      Expand All Collapse All    []Hide copied text  []Hover for attribution information  Medical chart review completed on 6/20/2018     Patient is a 68 y.o. year-old male with a past medical history significant for nicotine dependence admitted to [facility] on 6/16/2018 11:59 PM who initially presented to [facility] with over a day of numbness and weakness in the right hand with associated language deficits. The patient reported those symptoms gradually increased and he presented to the hospital for further evaluation.     MRI on June 17 showed occlusion of the distal left M1 with multifocal acute infarctions in the left frontal, parietal, temporal, and occipital lobes. MRA on June 17 showed occlusion of the distal left M1. He was outside of the window for thrombectomy/TPA.     Echocardiogram on June 18 showed normal left ventricular function and an ejection fraction of 65%. He had elevated cholesterol but a normal hemoglobin A1c at 5.6. EKG and monitoring showed normal sinus rhythm.     He was seen by neurology on June 17 and started on a statin, and aspirin.     Cardiology was consulted for assessment of implantable loop recorder due to the presumed cardioembolic etiology of the stroke.     He was allowed a period of permissive hypertension.  Blood pressure today is 124/55 mmHg     6 click scores are 24 for mobility and 17 for ADLs. He was evaluated by physical therapy on June 17 and required supervision for ambulation to 1000 feet. He was evaluated by  occupational therapy on June 17 and required moderate assistance for toileting and bathing. He was evaluated by speech language pathology on June 19 and had nonfluent aphasia.        PMH:  Past Medical History        Past Medical History:   Diagnosis Date   • Smoker              PSH:  Past Surgical History         Past Surgical History:   Procedure Laterality Date   • INGUINAL HERNIA REPAIR   2/14/2011     Performed by KELLY CHIN at SURGERY SAME DAY [facility] ORS   • OTHER         eye            MEDICATIONS:       Current Facility-Administered Medications   Medication Dose   • enoxaparin (LOVENOX) inj 40 mg  40 mg   • acetaminophen (TYLENOL) tablet 650 mg  650 mg   • ondansetron (ZOFRAN) syringe/vial injection 4 mg  4 mg   • ondansetron (ZOFRAN ODT) dispertab 4 mg  4 mg   • senna-docusate (PERICOLACE or SENOKOT S) 8.6-50 MG per tablet 2 Tab  2 Tab     And   • polyethylene glycol/lytes (MIRALAX) PACKET 1 Packet  1 Packet     And   • magnesium hydroxide (MILK OF MAGNESIA) suspension 30 mL  30 mL     And   • bisacodyl (DULCOLAX) suppository 10 mg  10 mg   • atorvastatin (LIPITOR) tablet 80 mg  80 mg   • labetalol (NORMODYNE,TRANDATE) injection 10 mg  10 mg     Or   • hydrALAZINE (APRESOLINE) injection 10 mg  10 mg   • aspirin (ASA) tablet 325 mg  325 mg     Or   • aspirin (ASA) chewable tab 324 mg  324 mg     Or   • aspirin (ASA) suppository 300 mg  300 mg         ALLERGIES:  Patient has no known allergies.     PSYCHOSOCIAL HISTORY:  He lives in a one-story home with his significant other.        The patient presents with cognition, self cares and speech deficits and Minimal  de-conditioning. Pre-morbidly, this patient lived in a single level home with None steps to enter. The patient was evaluated by acute care Physical Therapy, Occupational Therapy and Speech Language Pathology; currently requiring supervision assistance for mobility and moderate assistance for ADLs, also with ongoing speech deficits. The  patient's current diet is Regular with Thin liquids.         DISCUSSION AND RECOMMENDATIONS:  The patient is a 68-year-old male with suspected cardioembolic stroke     The patient is a good candidate for an acute inpatient rehabilitation program with a coordinated program of care at an intensity and frequency not available at a lower level of care.       Based on the current documentation of his functional level, the patient would be best served by admission to an inpatient rehabilitation hospital. He would need supervision support at discharge.     Note: if the patient continues to improve while waiting for medical clearance/insurance authorization, and no longer requires 2 of of 3 therapy services (PT/OT/SLP), the patient will no longer meet criteria for acute inpatient rehabilitation.           This recommendation is substantiated by the patient's current medical condition with intervention and assessment of medical issues requiring an acute level of care for patient's safety and maximum outcome. A coordinated program of care will be provided by an interdisciplinary team including occupational therapy, speech language pathology, physiatry, rehab nursing and rehab psychology. Rehab goals include improved cognition and speech, mobility, self-care management, strength and conditioning/endurance, pain management, bowel and bladder management, mood and affect, and safety with independent home management including caregiver training.      Estimated length of stay is approximately 14 days. Rehab potential: Very Good. Disposition: to pre-morbid independent living setting with supportive care of patient's significant other. We will continue to follow with you in anticipation of discharge to acute inpatient rehabilitation when medically stable to do so at the discretion of him attending physician. Thank you for the consultation. Please call with any questions regarding this recommendation.     Xavi Cedillo,  M.D.  Spinal Cord Injury Medicine  6/20/2018           Dr. Reeves consult:  Impression/Plan:  1)Cryptogenic stroke     -MR findings certainly compatible with cardiogenic source of thromboembolism  -I think ILR is very reasonable  -I explained the procedure to him, including risks/benefits and he is hesitant about proceeding, wants to talk to his family first  -I think if he decides he wants it done we can do it here or if he decides after discharge, he can see me for follow-up and get it done    Dr. Virgen consult:  Assessment/Plan:     MODERATE VOLUME LEFT HEMISPHERIC ( LEFT M1 CLOT ON CTA) ACUTE ISCHEMIC STROKES, HYPOPERFUSION WATERSHED PATTERN LIKELY DUE TO M1 CLOT WITH EXCELLENT COLLATERAL RECONSTITUTION OF FLOW RETROGRADE.      NOT IVTPA CANDIDATE NOR OSCAR CANDIDATE OUT OF WINDOW.     NIHSS score:  1a. LOC:   1b. LOC Questions: 2  1c. LOC Commands:   2. Best Gaze:   3. Visual Fields:   4. Facial Paresis: 1  5a. Motor arm left:   5b. Motor arm right: 1  6a. Motor leg left:   6b. Motor leg right: 1  7. Sensory:  8. Best Language: 2  9. Limb Ataxia:   10. Dysarthria:   11. Extinction/Inattention:      Total NIHSS: 7     Decision NOT to give alteplase: ON ADMIT  Contraindication for IVtPA: OUT OF WINDOW ON ARRIVAL     Presumed mechanism by TOAST:  __Large Artery Atherosclerosis  __Small Vessel (Lacunar)  _X_Cardioembolic  __Other   __Unknown        Workup     MRA NECK, small plaques on CTA neck, one seen at [facility] does not seem flow limiting   TTE with bubble, telemetry. Card MD consult for need for long term ICM or VENICE  Stroke labs include lipids/TFT/a1c/trop/UA     Treatment     Aspirin 325MG PO q daily, if need full anticoagulation for AFIB or other reason then should wait 7-10 days, repeat CT head to r/o ICH then could start aspirin when anticoag therapeutic.     STATIN FULL DOSE CRESTOR OR LIPITOR  ACEI/ARB, HCTZ, AND/OR CCB FOR JNC GOALS   BG MONITOR/CNTRL, DIET & LIFESTYLE/AVOID SMOKING DISCUSSED     No further  neuro workup as inpatient if aforementioned studies WNL & maintains neuro baseline.  Neuro sign off, reconsult PRN.  F/u otpt Neuro ASAP.         Gregory Virgen M.D.  , Neurohospitalist & Stroke  Clinical Professor, [employer]  Diplomate, Neurology & Neuroimaging    CEASAR Holt Nurse Practitioner Addendum Cardiac Electrophysiology  Progress Notes Date of Service: 6/20/2018  8:48 AM         []Hide copied text  Patient admitted with aphasia and right sided weakness.  Per Dr Reeves consultative note:  HPI:  Patient is a 69 yo WM. He is from [place]. On Friday evening he was in his usual state of health when he had sudden onset aphasia. This was associated with R sided weakness. This did not get better, so about a day later he presented to the hospital. At that time, he apparently had quite a bit of difficulty with speech. Work up here on MRI showed chronic ischemic changes, occlusion of distal left M1, and then multifocal acute infarcts including L frontal, parietal, posterior temporal, lateral occipital lobes, and L sided basal ganglia. Echo unrevealing. Rhythm monitoring here has been sinus. EP asked to consider ILR to look for subclinical AF.      Impression/Plan:  1)Cryptogenic stroke     -MR findings certainly compatible with cardiogenic source of thromboembolism  -I think ILR is very reasonable  -I explained the procedure to him, including risks/benefits and he is hesitant about proceeding, wants to talk to his family first  -I think if he decides he wants it done we can do it here or if he decides after discharge, he can see me for follow-up and get it done     Patient had a demi implanted in his leg in the past with reaction of itching.  Questionable titanium allergy.   This issue was discussed with patient and family at bedside. Device is implanted into SQ fat if redness occurs would explant device.  Patient verbalized understanding .  Patient agreeable to implantation of ILR  this AM.  Consent ordered.            Brain MRI 06-17-18:  1. Age-related cerebral atrophy.    2. Mild periventricular white matter changes consistent with chronic microvascular ischemic gliosis.    3. Occlusion of distal left M1 segment.    4. Multifocal areas of acute infarction involving the left frontal, parietal, posterior temporal, and lateral occipital lobes, as well as the left-sided basal ganglia. No significant intracranial mass effect.    Co-morbidities: See PMH  Potential Risk - Complications: Aphasia, Cognitive Impairment, Contractures, Deep Vein Thrombosis, Dysphagia, Incontinence, Malnutrition, Pain, Perceptual Impairment, Pneumonia, Pressure Ulcer and Urinary Tract Infection  Level of Risk: High    Ongoing Medical Management Needed (Medical/Nursing Needs):   Patient Active Problem List    Diagnosis Date Noted   • Hyperlipidemia 06/18/2018   • Aphasia due to acute cerebrovascular accident (CVA) ([facility]) 06/18/2018   • Acute CVA (cerebrovascular accident) ([facility]) 06/17/2018   • Essential hypertension 06/17/2018     A & O with exp aph.    Current Vital Signs:   Temperature: 36.4 °C (97.5 °F) Pulse:  (98) Respiration: 19 Blood Pressure : 124/55  Weight: 81.6 kg (180 lb) Height: 182.9 cm (6')  Pulse Oximetry: 97 % O2 (LPM): 0      Completed Laboratory Reports:  Recent Labs      06/18/18   0531   WBC  8.6   HEMOGLOBIN  14.4   HEMATOCRIT  43.9   PLATELETCT  168   SODIUM  139   POTASSIUM  3.8   BUN  12   CREATININE  0.62   GLUCOSE  97     Additional Labs: Not Applicable    Prior Living Situation:   Housing / Facility: 1 Story House  Steps Into Home: 0  Lives with - Patient's Self Care Capacity: Significant Other  Equipment Owned: None    Prior Level of Function / Living Situation:   Physical Therapy: Prior Services: Home-Independent  Housing / Facility: 1 Story House  Steps Into Home: 0  Equipment Owned: None  Lives with - Patient's Self Care Capacity: Significant Other  Bed Mobility: Independent  Transfer  Status: Independent  Ambulation: Independent  Distance Ambulation (Feet):  (community)  Assistive Devices Used: None  Current Level of Function:   Level Of Assist: Supervised  Assistive Device: None  Distance (Feet): 1000  Weight Bearing Status: no restrictions  Supine to Sit: Supervised  Sit to Supine: Supervised  Scooting: Independent  Rolling: Independent  Sit to Stand: Contact Guard Assist  Toilet Transfers: Minimal Assist  Tub / Shower Transfers: Minimal Assist  Transfer Method: Stand Pivot  Sitting Edge of Bed: 10+ mins  Standin mins  Occupational Therapy:   Self Feeding: Independent  Grooming / Hygiene: Independent  Bathing: Independent  Dressing: Independent  Toileting: Independent  Prior Services: Home-Independent  Housing / Facility: [address]  Occupation (Pre-Hospital Vocational): Not Employed ()  Current Level of Function:   Eating: Independent  Bathing: Moderate Assist  Upper Body Dressing: Minimal Assist  Lower Body Dressing: Minimal Assist  Toileting: Moderate Assist  Speech Language Pathology:   Assessment : The patient was seen for an aphasia evaluation this date.  The Western Aphasia Battery was administered.  Pt presents with a non-fluent type aphasia, characterized by mostly complete sentences with occasional hesitation and use of paraphasias, as well as word finding difficulty and some articulation errors.  Pt's auditory comprehension appears to be stronger than his verbal expression, however he is able to make his wants, needs and opinions knows using compensatory word finding strategies.  Pt presents with min to mod word finding difficulty in conversation, and independently uses circumlocution to compensate.  He has moderate deficits naming pictures, however was able to name objects, body parts and colors with 100% accuracy.  Pt has minimal deficits answering complex yes/no questions and following abstract directions. He has severe deficits with reading comprehension.  His  accuracy identifying letters is only 50%, and pt is unable to read simple words, or match pictures to words.  Pt has good awareness of his deficits and is motivated to improve.  He will benefit from post acute SLP services to address aphasia and to assist with return to PLOF.  SLP is following in the acute care setting.  Thank you for the consult.   Problem List: Aphasia  Rehabilitation Prognosis/Potential: Good  Estimated Length of Stay: 14-21 days    Nursing:   Orientation : Oriented x 4  Continent    Scope/Intensity of Services Recommended:  Physical Therapy: 1 hr / day  5 days / week. Therapeutic Interventions Required: Maximize Endurance, Mobility, Strength and Safety  Occupational Therapy: 1 hr / day 5 days / week. Therapeutic Interventions Required: Maximize Self Care, ADLs, IADLs and Energy Conservation  Speech & Language Pathology: 1 hr / day 5 days / week. Therapeutic Interventions Required: Maximize Cognition, Swallowing and Safety  Rehabilitation Nursin/. Therapeutic Interventions Required: Monitor Pain, Skin, Vital Signs, Intake and Output, Labs, Safety and Aspiration Risk  Rehabilitation Physician: 3 - 5 days / week. Therapeutic Interventions Required: Medical Management    Rehabilitation Goals and Plan (Expected frequency & duration of treatment in the IRF):   Return to the Community, Modified Independent Level of Care and Family Able to Provide 24/ Assistance  Anticipated Date of Rehabilitation Admission: 18  Patient/Family oriented IRF level of care/facility/plan: Yes  Patient/Family willing to participate in IRF care/facility/plan: Yes  Patient able to tolerate IRF level of care proposed: Yes  Patient has potential to benefit IRF level of care proposed: Yes  Comments: Not Applicable    Special Needs or Precautions - Medical Necessity:  Safety Concerns/Precautions:  Fall Risk / High Risk for Falls, Balance, Cognition and Bed / Chair Alarm  Pain Management  IV Site: Peripheral  Current  Medications:    Current Facility-Administered Medications Ordered in Epic   Medication Dose Route Frequency Provider Last Rate Last Dose   • enoxaparin (LOVENOX) inj 40 mg  40 mg Subcutaneous DAILY Christian Kidd M.D.   40 mg at 06/20/18 0924   • acetaminophen (TYLENOL) tablet 650 mg  650 mg Oral Q6HRS PRN Christian Kidd M.D.       • ondansetron (ZOFRAN) syringe/vial injection 4 mg  4 mg Intravenous Q4HRS PRN Christian Kidd M.D.       • ondansetron (ZOFRAN ODT) dispertab 4 mg  4 mg Oral Q4HRS PRN Christian Kidd M.D.       • senna-docusate (PERICOLACE or SENOKOT S) 8.6-50 MG per tablet 2 Tab  2 Tab Oral BID Christian Kidd M.D.   2 Tab at 06/20/18 0924    And   • polyethylene glycol/lytes (MIRALAX) PACKET 1 Packet  1 Packet Oral QDAY PRN Christian Kidd M.D.        And   • magnesium hydroxide (MILK OF MAGNESIA) suspension 30 mL  30 mL Oral QDAY PRN Christian Kidd M.D.        And   • bisacodyl (DULCOLAX) suppository 10 mg  10 mg Rectal QDAY PRN Christian Kidd M.D.       • atorvastatin (LIPITOR) tablet 80 mg  80 mg Oral Q EVENING Christian Kidd M.D.   80 mg at 06/19/18 2043   • labetalol (NORMODYNE,TRANDATE) injection 10 mg  10 mg Intravenous Q4HRS PRN Christian Kidd M.D.        Or   • hydrALAZINE (APRESOLINE) injection 10 mg  10 mg Intravenous Q2HRS PRN Christian Kidd M.D.       • aspirin (ASA) tablet 325 mg  325 mg Oral DAILY Christian Kidd M.D.   325 mg at 06/20/18 0924    Or   • aspirin (ASA) chewable tab 324 mg  324 mg Oral DAILY Christian Kidd M.D.        Or   • aspirin (ASA) suppository 300 mg  300 mg Rectal DAILY Christian Kidd M.D.         No current [facility]-ordered outpatient prescriptions on file.     Diet:   DIET ORDERS (Through next 24h)    Start     Ordered    06/17/18 0435  DIET ORDER  ALL MEALS     Question:  Diet:  Answer:  Regular    06/17/18 0435          Anticipated Discharge Destination / Patient/Family Goal:  Destination: Home with Assistance Support System: Family   Anticipated home  health services: OT, PT and SLP  Previously used HH service/ provider: Not Applicable  Anticipated DME Needs: Walker, Wheelchair and [employer]  Outpatient Services: OT, PT and SLP  Alternative resources to address additional identified needs:     Pre-Screen Completed: 6/20/2018 11:40 AM Talib Serra L.P.N.

## 2018-06-20 NOTE — PROGRESS NOTES
"Patient is trying to get out of bed to leave stating \"I need to get home before I lose my house\".  Patient was re-oriented to situation and that he will be staying in the hospital tonight.  Currently resting.  "

## 2018-06-20 NOTE — OP REPORT
PROCEDURE PERFORMED: Implantable Loop Recorder    : STEPHANIE Madden M.D.    ASSISTANT: None    ANESTHESIA: Local    EBL: <5 cc    SPECIMENS: None    INDICATION: Recurrent CVA    COMPLICATIONS: None    PRE-PROCEDURE ECG: SR    POST-PROCEDURE ECG: SR    DESCRIPTION OF PROCEDURE:  After informed written consent, the patient was brought to the cath lab pre/post procedure area. The patient was prepped and draped in the usual sterile fashion. The procedure was performed with local anesthetic. Using the supplied incision tool, a <1 cm incision was made in the skin about 2 cm leftward and lateral to the sternal border. Using the supplied insertion tool, a tunnel was made in the subcutaneous tissue at a 45 degree angle to the sternum and the device was inserted with the supplied plunger. Manual compression was used until hemostasis achieved. Dermabond used for closure. Sterile dressing was applied. Sensing checked and adequate.    IMPLANTED DEVICE INFORMATION:  Model: [employer] LINQ 11  Serial number: [id number]     IMPRESSIONS:  1. Successful implantable loop recorder implantation    RECOMMENDATIONS:  1. Routine follow-up and device interrogation

## 2018-06-20 NOTE — DISCHARGE SUMMARY
Discharge Summary    CHIEF COMPLAINT ON ADMISSION  Chief Complaint   Patient presents with   • Possible Stroke       Reason for Admission  EMS 09     CODE STATUS  Full Code    HPI & HOSPITAL COURSE  This is a 68 y.o. male here with acute CVA. Please see the previously dictated history of present illness 6/17/18 by Dr. Kidd for complete details. In short, the patient presented 26 hours after developing decreased coordination and weakness of the right hand with expressive dysphasia. He originally presented to [facility] were CTA of the head showed occlusion of the left middle cerebral artery M1. Unfortunately the patient was beyond the window for interventional radiology.    MRI of the brain without contrast showed multifocal areas of acute infarction in the left frontal, parietal, posterior temporal, and bilateral occipital lobes, as well as a left-sided basal ganglia. The patient was started on 325 mg of aspirin as well as 80 mg of atorvastatin. He was allowed a period of permissive hypertension although his blood pressure remained normal throughout his stay. Patient has a history of tobacco dependence and has been counseled on the need for cessation.    Lipid panel was consistent with hyperlipidemia with total cholesterol at 212, HDL low at 31, and LDL elevated at 162 without therapy. Hemoglobin A1c was normal at 5.6. TTE showed normal left ventricular systolic function and no evidence of valvular abnormality. There is no evidence of shunt although bubble study was not performed. CTA of the head and neck was performed at an outside facility and was reviewed by neurology, and was interpreted as hypo-profusion watershed pattern, likely due to M1 clot with excellent collateral reconstitution of flow retrograde.    The patient was monitored on telemetry throughout his stay. On the day of discharge he received an implanted loop recorder with instructions to follow-up in the EP clinic for interrogation.  He was evaluated by PT OT and speech-language pathology. Further inpatient therapy was recommended through rehab, or skilled nursing facilities. At the time of discharge to a acute inpatient rehab, the patient had improvement in his expressive aphasia. He was tolerating a regular diet without evidence of aspiration. He was cleared by physical therapy to either inpatient rehab or home without an assistive devices.     Therefore, he is discharged in good and stable condition to an inpatient rehabilitation hospital.    The patient met 2-midnight criteria for an inpatient stay at the time of discharge.      FOLLOW UP ITEMS POST DISCHARGE  [facility] clinic for ILR interrogation    DISCHARGE DIAGNOSES  Principal Problem:    Acute CVA (cerebrovascular accident) (HCC) POA: Yes  Active Problems:    Essential hypertension POA: Yes    Hyperlipidemia POA: Yes    Aphasia due to acute cerebrovascular accident (CVA) (HCC) POA: Yes  Resolved Problems:    * No resolved hospital problems. *      FOLLOW UP  Future Appointments  Date Time Provider Department Center   6/25/2018 10:20 AM Kam Mckeon M.D. [facility] None     No follow-up provider specified.    MEDICATIONS ON DISCHARGE     Medication List      START taking these medications      Instructions   acetaminophen 325 MG Tabs  Commonly known as:  TYLENOL   Take 2 Tabs by mouth every 6 hours as needed (Mild Pain; (Pain scale 1-3); Temp greater than 100.5 F).  Dose:  650 mg     aspirin 325 MG Tabs  Start taking on:  6/21/2018  Commonly known as:  ASA   Take 1 Tab by mouth every day.  Dose:  325 mg     atorvastatin 80 MG tablet  Commonly known as:  LIPITOR   Take 1 Tab by mouth every evening.  Dose:  80 mg     enoxaparin 40 MG/0.4ML Soln inj  Start taking on:  6/21/2018  Commonly known as:  LOVENOX   Inject 40 mg as instructed every day.  Dose:  40 mg     ondansetron 4 MG Tbdp  Commonly known as:  ZOFRAN ODT   Take 1 Tab by mouth every four hours as needed (give PO if IV route is  unavailable. May give per feeding tube.).  Dose:  4 mg     senna-docusate 8.6-50 MG Tabs  Commonly known as:  PERICOLACE or SENOKOT S   Take 2 Tabs by mouth 2 Times a Day.  Dose:  2 Tab            Allergies  No Known Allergies    DIET  Orders Placed This Encounter   Procedures   • DIET ORDER     Standing Status:   Standing     Number of Occurrences:   1     Order Specific Question:   Diet:     Answer:   Regular [1]       ACTIVITY  As tolerated.  Weight bearing as tolerated    LINES, DRAINS, AND WOUNDS  This is an automated list. Peripheral IVs will be removed prior to discharge.  PIV Group Left Antecubital 18g Flexible Catheter (Active)   Line Secured Taped;Transparent 6/20/2018  8:00 AM   Site Condition / Description Assessed;Patent 6/20/2018  8:00 AM   Dressing Type / Description Transparent;Clean;Dry;Intact 6/20/2018  8:00 AM   Dressing Status Observed 6/20/2018  8:00 AM   Saline Locked Yes 6/20/2018  8:00 AM   Infiltration Grading Used by [facility] 0 6/20/2018  8:00 AM   Phlebitis Scale (Used by [facility]) 0 6/20/2018  8:00 AM   Date Primary Tubing Changed 06/17/18 6/17/2018  4:24 AM   NEXT Primary Tubing Change  06/19/18 6/17/2018  4:24 AM                     MENTAL STATUS ON TRANSFER  Level of Consciousness: Alert  Orientation : Oriented x 4  Speech: Expressive Aphasia    CONSULTATIONS  Neurology    PROCEDURES  Implantable Loop Recorder, 6/20/2018    LABORATORY  Lab Results   Component Value Date    SODIUM 139 06/18/2018    POTASSIUM 3.8 06/18/2018    CHLORIDE 111 06/18/2018    CO2 22 06/18/2018    GLUCOSE 97 06/18/2018    BUN 12 06/18/2018    CREATININE 0.62 06/18/2018        Lab Results   Component Value Date    WBC 8.6 06/18/2018    HEMOGLOBIN 14.4 06/18/2018    HEMATOCRIT 43.9 06/18/2018    PLATELETCT 168 06/18/2018        Total time of the discharge process exceeds 38 minutes.

## 2018-06-20 NOTE — DISCHARGE PLANNING
Anticipated Discharge Disposition: IRF    Action: LSW notified by Talib Serra with [facility] that pt has been accepted and will transfer today at 1400. Pt's son, Jason has been notified    Bedside RN and MD notified.    Barriers to Discharge: None    Plan: Pt to dc to [facility] at 1630

## 2018-06-20 NOTE — H&P
REHABILITATION HISTORY AND PHYSICAL/POST ADMISSION EVALUATION    6/20/2018  4:40 PM  Cortez Mari  [id number]  Admission  6/20/2018  2:41 PM  Reason for admission: Acute CVA (cerebrovascular accident) (HCC)    HPI:  Mr. Mari is a 68 year-old right hand dominant male with an unremarkable past medical history admitted to [facility] on 6/16/2018 11:59 PM after he initially presented to [facility] with over a day of numbness and weakness in the right hand with associated language deficits. The patient reported those symptoms gradually increased and he presented to the hospital for further evaluation.     MRI on June 17 showed occlusion of the distal left M1 with multifocal acute infarctions in the left frontal, parietal, temporal, and occipital lobes. MRA on June 17 showed occlusion of the distal left M1. He was outside of the window for thrombectomy/TPA.     Echocardiogram on June 18 showed normal left ventricular function and an ejection fraction of 65%. He had elevated cholesterol but a normal hemoglobin A1c at 5.6. EKG and monitoring showed normal sinus rhythm.     He was seen by neurology on June 17 and started on a statin, and aspirin.     Cardiology was consulted for assessment of implantable loop recorder due to the presumed cardioembolic etiology of the stroke.     He was allowed a period of permissive hypertension. Blood pressure today is 124/55 mmHg     6 click scores are 24 for mobility and 17 for ADLs. He was evaluated by physical therapy on June 17 and required supervision for ambulation to 1000 feet. He was evaluated by occupational therapy on June 17 and required moderate assistance for toileting and bathing. He was evaluated by speech language pathology on June 19 and had nonfluent aphasia.    He was admitted to [facility] on June 20, 2018    On admission, he reports clumsiness and weakness in the right hand. He reports that his balance is  improving. He denies any coughing or choking. He denies fevers, chills, chest pain, or shortness of breath. He reports that his face feels numb on the right side. He reports clumsiness in his right hand. He is extremely frustrated because he is unable to read because the words on the page no longer make sense.  He reports making a lot of errors when trying to speak. He reports that he knows the word but cannot keep his mouth to say it.  He denies any irregular heartbeats. He denies changes in vision or hearing. He denies any loss of bowel or bladder control    REVIEW OF SYSTEMS:     All other systems were reviewed and are negative      PMH:  Past Medical History:   Diagnosis Date   • Smoker        PSH:  Past Surgical History:   Procedure Laterality Date   • INGUINAL HERNIA REPAIR  2/14/2011    Performed by KELLY CHIN at SURGERY SAME DAY [facility] ORS   • OTHER      eye       FAMILY HISTORY:  No neurodegenerative conditions    MEDICATIONS:  Current Facility-Administered Medications   Medication Dose   • Respiratory Care per Protocol     • Pharmacy Consult Request ...Pain Management Review 1 Each  1 Each   • acetaminophen (TYLENOL) tablet 650 mg  650 mg   • artificial tears 1.4 % ophthalmic solution 1 Drop  1 Drop   • benzocaine-menthol (CEPACOL) lozenge 1 Lozenge  1 Lozenge   • hydrALAZINE (APRESOLINE) tablet 25 mg  25 mg   • mag hydrox-al hydrox-simeth (MAALOX PLUS ES or MYLANTA DS) suspension 20 mL  20 mL   • ondansetron (ZOFRAN ODT) dispertab 4 mg  4 mg    Or   • ondansetron (ZOFRAN) syringe/vial injection 4 mg  4 mg   • traZODone (DESYREL) tablet 50 mg  50 mg   • sodium chloride (OCEAN) 0.65 % nasal spray 2 Spray  2 Spray   • aspirin (ASA) tablet 325 mg  325 mg   • docusate sodium (COLACE) capsule 100 mg  100 mg    And   • senna-docusate (PERICOLACE or SENOKOT S) 8.6-50 MG per tablet 1 Tab  1 Tab    And   • lactulose 20 GM/30ML solution 30 mL  30 mL    And   • bisacodyl (DULCOLAX) suppository 10 mg  10  mg       ALLERGIES:  Patient has no known allergies.    PSYCHOSOCIAL HISTORY:  He lives in a single level apartment with his girlfriend. There are several stairs to enter. He has never needed adaptive equipment previously. He is a retired .  His son and daughter-in-law live nearby and are supportive    LEVEL OF FUNCTION PRIOR TO DISABILTY:  Fully independent    LEVEL OF FUNCTION PRIOR TO ADMISSION to [facility]:  6 click scores are 24 for mobility and 17 for ADLs. He was evaluated by physical therapy on June 17 and required supervision for ambulation to 1000 feet. He was evaluated by occupational therapy on June 17 and required moderate assistance for toileting and bathing. He was evaluated by speech language pathology on June 19 and had nonfluent aphasia.    CURRENT LEVEL OF FUNCTION:   Same as level of function prior to admission to [facility]    PHYSICAL EXAM:     VITAL SIGNS:   height is 1.829 m (6') and weight is 95 kg (209 lb 8 oz). His temperature is 37.1 °C (98.7 °F). His blood pressure is 136/87 and his pulse is 75. His respiration is 18 and oxygen saturation is 94%.     GENERAL: No apparent distress.  Sitting upright in bed  HEENT:  Slight right-sided facial droop  CARDIAC: Regular rate and rhythm  LUNGS: Clear to auscultation   Chest:  Loop recorder implantation site is clean, dry, and intact.  ABDOMINAL: bowel sounds present, soft, nontender and nondistended    EXTREMITIES: no spasticity or no edema    NEURO:    Mental status: Full mental status examination is limited by his aphasia. He is awake, alert, and cooperative with all aspects of the examination. He is pleasant throughout. He is unable to name the month, date, day, or year. He knows that he is in [place].    Speech/language: Fluctuating expressive aphasia with paraphasic errors. Unable to understand written words in his admission binder.  Unable to follow written commands.  Able to follow single  and multistep auditory commands    CRANIAL NERVES:  2,3: visual acuity grossly intact, PERRL  3,4,6: EOMI bilaterally, no nystagmus or diplopia  5: Subjective right-sided numbness  7: Mild right facial droop  8: hearing grossly intact  9,10: symmetric palate elevation  11: Asymmetric trapezius activation with mild weakness on the right  12: tongue protrudes midline    Motor:      Left upper limb strength is intact throughout. Right upper limb strength is 4/5 (although he is very strong, there is side to side asymmetry).  Right lower limb strength is 4/5 in the hip flexor and extensor but 5/5 distally. Left lower limb strength is 5/5 throughout    Sensory: Mild areas of patchy abnormality on the right side    Reflexes: Mildly hyperreflexic on the right side in the brachioradialis, tricep, wrist extensor, patella. Toes are upgoing on the right side. Abel's is negative bilaterally    Tone: no spasticity noted    Mild right pronator drift      Skin:  Hands are thickly calloused. Incision over implantable loop recorder is clean and intact      RADIOLOGY:  I independently reviewed the images and report of the MRI of the brain from June 17 showing left frontal, parietal, temporal, occipital, and basal ganglia ischemic infarcts.    LABS:  Recent Labs      06/18/18   0531   SODIUM  139   POTASSIUM  3.8   CHLORIDE  111   CO2  22   GLUCOSE  97   BUN  12   CREATININE  0.62   CALCIUM  8.4*     Recent Labs      06/18/18   0531   WBC  8.6   RBC  4.87   HEMOGLOBIN  14.4   HEMATOCRIT  43.9   MCV  90.1   MCH  29.6   MCHC  32.8*   RDW  43.8   PLATELETCT  168   MPV  10.3             PRIMARY REHAB DIAGNOSIS:    This patient is a 68 y.o. male admitted for acute inpatient rehabilitation with Acute CVA (cerebrovascular accident) (HCC).    IMPAIRMENTS:   Cognitive  ADLs/IADLs  Speech      RELEVANT CHANGES SINCE PREADMISSION EVALUATION:    Status unchanged    The patient's rehabilitation potential is Very Good  The patient's medical  prognosis is good    PLAN:   Discussion and Recommendations:   1. The patient requires an acute inpatient rehabilitation program with a coordinated program of care at an intensity and frequency not available at a lower level of care. This recommendation is substantiated by the patient's medical physicians who recommend that the patient's intervention and assessment of medical issues needs to be done at an acute level of care for patient's safety and maximum outcome.   2. A coordinated program of care will be supplied by an interdisciplinary team of physical therapy, occupational therapy, rehab physician, rehab nursing, and, if needed, speech therapy and rehab psychology. Rehab team presents a patient-specific rehabilitation and education program concentrating on prevention of future problems related to accessibility, mobility, skin, bowel, bladder, sexuality, and psychosocial and medical/surgical problems.   3. Need for Rehabilitation Physician: The rehab physician will be evaluating the patient on a multi-weekly basis to help coordinate the program of care. The rehab physician communicates between medical physicians, therapists, and nurses to maximize the patient's potential outcome. Specific areas in which the rehab physician will be providing daily assessment include the following:   A. Assessing the patient's heart rate and blood pressure response (vitals monitoring) to activity and making adjustments in medications or conservative measures as needed.   B. The rehab physician will be assessing the frequency at which the program can be increased to allow the patient to reach optimal functional outcome.   C. The rehab physician will also provide assessments in daily skin care, especially in light of patient's impairments in mobility.   D. The rehab physician will provide special expertise in understanding how to work with functional impairment and recommend appropriate interventions, compensatory techniques, and  education that will facilitate the patient's outcome.   4. Rehab R.N.   The rehab RN will be working with patient to carry over in room mobility and activities of daily living when the patient is not in 3 hours of skilled therapy. Rehab nursing will be working in conjunction with rehab physician to address all the medical issues above and continue to assess laboratory work and discuss abnormalities with the treating physicians, assess vitals, and response to activity, and discuss and report abnormalities with the rehab physician. Rehab RN will also continue daily skin care, supervise bladder/bowel program, instruct in medication administration, and ensure patient safety.     E. Therapies to treat at intensity and frequency of (may change after completion of evaluation by all therapeutic disciplines):       PT:  Physical therapy to address mobility, transfer, gait training and evaluation for adaptive equipment needs 1hour/day at least 5 days/week for the duration of the ELOS (see below)       OT:  Occupational therapy to address ADLs, self-care, home management training, functional mobility/transfers and assistive device evaluation, and community re-integration 1hour/day at least 5 days/week for the duration of the ELOS (see below).        ST/Dysphagia:  Speech therapy to address speech, language, and cognitive deficits as well as swallowing difficulties with retraining/dysphagia management and community re-integration with comprehension, expression, cognitive training 1hour/day at least 5 days/week for the duration of the ELOS (see below).     F. Medical management / Rehabilitation Issues/ Adverse Potential as part of rehabilitation plan           THIS PATIENT IS ON THE STROKE PATHWAY    Mr. Mari is a 68 year old male admitted for rehabilitation on June 20 following ischemic strokes    Left frontal, parietal, temporal, occipital, and basal ganglia ischemic strokes presumed cardioembolic source    Aphasia  Alexia  Right Hemiparesis (dominant side)  Initiate comprehensive rehabilitation  Will need cardiology follow-up to assess loop recorder results  His already had hemoglobin A1c checked on admission and it was 5.6 (normal)  Plan to request swallow evaluation in addition to language evaluation by speech language pathology team  Aspirin 325 mg daily    Hypertension  Continue to monitor for recurrence and treat as needed    Hyperlipidemia  Admission total cholesterol was 212 with LDL of 162  Lipitor 80 mg daily at bedtime    DVT prophylaxis  Lovenox 40 g daily    Estimated discharge: 14-21 days      Verified CODE STATUS as full on admission     Admission care coordination time today was  70 min, and entire time spent in face-to-face contact was >50% in counseling and coordination of care as detailed in A/P above.        GOALS/EXPECTED LEVEL OF FUNCTION BASED ON CURRENT MEDICAL AND FUNCTIONAL STATUS (may change based on patient's medical status and rate of impairment recovery):  Transfers:   Modified Independent  Mobility/Gait:   Modified Independent  ADL's:   Modified Independent  Cognition:  Modified independent  Speech: Able to express needs and understand basic written information     DISPOSITION: Discharge to pre-morbid independent living setting with the supportive care of patient's signigicant other and community resources.    Xavi Cedillo MD  6/20/2018  4:40 PM

## 2018-06-20 NOTE — CONSULTS
EP Consult Note    DOS: 6/19/2018    Consulting physician: Christian Kidd MD    Chief complaint/Reason for consult: Cryptogenic stroke    HPI:  Patient is a 67 yo WM. He is from [place]. On Friday evening he was in his usual state of health when he had sudden onset aphasia. This was associated with R sided weakness. This did not get better, so about a day later he presented to the hospital. At that time, he apparently had quite a bit of difficulty with speech. Work up here on MRI showed chronic ischemic changes, occlusion of distal left M1, and then multifocal acute infarcts including L frontal, parietal, posterior temporal, lateral occipital lobes, and L sided basal ganglia. Echo unrevealing. Rhythm monitoring here has been sinus. EP asked to consider ILR to look for subclinical AF.     ROS (+ highlighted in red):  Constitutional: Fevers/chills/fatigue/weightloss  HEENT: Blurry vision/eye pain/sore throat/hearing loss  Respiratory: Shortness of breath/cough  Cardiovascular: Chest pain/palpitations/edema/orthopnea/syncope  GI: Nausea/vomitting/diarrhea  MSK: Arthralgias/myagias/muscle weakness  Skin: Rash/sores  Neurological: Aphasia/Numbness/tremors/vertigo  Endocrine: Excessive thirst/polyuria/cold intolerance/heat intolerance  Psych: Depression/anxiety    Past Medical History:   Diagnosis Date   • Smoker        Past Surgical History:   Procedure Laterality Date   • INGUINAL HERNIA REPAIR  2/14/2011    Performed by KELLY CHIN at SURGERY SAME DAY [facility] ORS   • OTHER      eye       Social History     Social History   • Marital status:      Spouse name: N/A   • Number of children: N/A   • Years of education: N/A     Occupational History   • Not on file.     Social History Main Topics   • Smoking status: Current Every Day Smoker     Packs/day: 1.00     Types: Cigarettes   • Smokeless tobacco: Never Used      Comment: 0.5ppd x35 yrs   • Alcohol use No   • Drug use: No   • Sexual activity: Not on file      Other Topics Concern   • Not on file     Social History Narrative   • No narrative on file       History reviewed. No pertinent family history.    No Known Allergies    Current Facility-Administered Medications   Medication Dose Route Frequency Provider Last Rate Last Dose   • enoxaparin (LOVENOX) inj 40 mg  40 mg Subcutaneous DAILY Christian Kidd M.D.   40 mg at 06/19/18 0825   • acetaminophen (TYLENOL) tablet 650 mg  650 mg Oral Q6HRS PRN Christian Kidd M.D.       • ondansetron (ZOFRAN) syringe/vial injection 4 mg  4 mg Intravenous Q4HRS PRN Christian Kidd M.D.       • ondansetron (ZOFRAN ODT) dispertab 4 mg  4 mg Oral Q4HRS PRN Christian Kidd M.D.       • senna-docusate (PERICOLACE or SENOKOT S) 8.6-50 MG per tablet 2 Tab  2 Tab Oral BID Christian Kidd M.D.        And   • polyethylene glycol/lytes (MIRALAX) PACKET 1 Packet  1 Packet Oral QDAY PRN Christian Kidd M.D.        And   • magnesium hydroxide (MILK OF MAGNESIA) suspension 30 mL  30 mL Oral QDAY PRN Christian Kidd M.D.        And   • bisacodyl (DULCOLAX) suppository 10 mg  10 mg Rectal QDAY PRN Christian Kidd M.D.       • atorvastatin (LIPITOR) tablet 80 mg  80 mg Oral Q EVENING Christian Kidd M.D.   80 mg at 06/18/18 2020   • labetalol (NORMODYNE,TRANDATE) injection 10 mg  10 mg Intravenous Q4HRS PRN Christian Kidd M.D.        Or   • hydrALAZINE (APRESOLINE) injection 10 mg  10 mg Intravenous Q2HRS PRN Christian Kidd M.D.       • aspirin (ASA) tablet 325 mg  325 mg Oral DAILY Christian Kidd M.D.   325 mg at 06/19/18 0825    Or   • aspirin (ASA) chewable tab 324 mg  324 mg Oral DAILY Christian Kidd M.D.        Or   • aspirin (ASA) suppository 300 mg  300 mg Rectal DAILY Christian Kidd M.D.           Physical Exam:  Vitals:    06/19/18 0000 06/19/18 0400 06/19/18 0800 06/19/18 1600   BP: 132/83 139/81 125/85 139/83   Pulse: 81 66 71 66   Resp: 18 18 15 15   Temp: 37.1 °C (98.7 °F) 36.8 °C (98.2 °F) 36.4 °C (97.5 °F) 36.4 °C (97.6 °F)    SpO2: 98% 94% 96% 96%   Weight:       Height:         General appearance: NAD, conversant   Eyes: anicteric sclerae, moist conjunctivae; no lid-lag; PERRLA  HENT: Atraumatic; oropharynx clear with moist mucous membranes and no mucosal ulcerations; normal hard and soft palate  Neck: Trachea midline; FROM, supple, no thyromegaly or lymphadenopathy  Lungs: CTA, with normal respiratory effort and no intercostal retractions  CV: RRR, no MRGs, no JVD   Abdomen: Soft, non-tender; no masses or HSM  Extremities: No peripheral edema or extremity lymphadenopathy  Skin: Normal temperature, turgor and texture; no rash, ulcers or subcutaneous nodules  Psych: Appropriate affect, alert and oriented to person, place and time    Data:  Labs reviewed    Prior echo results reviewed    MR results reviewed    EKG interpreted by me:   NSR    Impression/Plan:  1)Cryptogenic stroke    -MR findings certainly compatible with cardiogenic source of thromboembolism  -I think ILR is very reasonable  -I explained the procedure to him, including risks/benefits and he is hesitant about proceeding, wants to talk to his family first  -I think if he decides he wants it done we can do it here or if he decides after discharge, he can see me for follow-up and get it done    Nakul Reeves MD

## 2018-06-20 NOTE — PROGRESS NOTES
Monitor summary: SR 63-83, FL 0.20, QRS 0.08, QT 0.38, with rare PVCs and rare couplets per strip from monitor room.

## 2018-06-20 NOTE — CONSULTS
Medical chart review completed on 6/20/2018    Patient is a 68 y.o. year-old male with a past medical history significant for nicotine dependence [facility] on 6/16/2018 11:59 PM who initially presented to [facility] with over a day of numbness and weakness in the right hand with associated language deficits. The patient reported those symptoms gradually increased and he presented to the hospital for further evaluation.    MRI on June 17 showed occlusion of the distal left M1 with multifocal acute infarctions in the left frontal, parietal, temporal, and occipital lobes. MRA on June 17 showed occlusion of the distal left M1. He was outside of the window for thrombectomy/TPA.    Echocardiogram on June 18 showed normal left ventricular function and an ejection fraction of 65%. He had elevated cholesterol but a normal hemoglobin A1c at 5.6. EKG and monitoring showed normal sinus rhythm.    He was seen by neurology on June 17 and started on a statin, and aspirin.    Cardiology was consulted for assessment of implantable loop recorder due to the presumed cardioembolic etiology of the stroke.    He was allowed a period of permissive hypertension.  Blood pressure today is 124/55 mmHg    6 click scores are 24 for mobility and 17 for ADLs. He was evaluated by physical therapy on June 17 and required supervision for ambulation to 1000 feet. He was evaluated by occupational therapy on June 17 and required moderate assistance for toileting and bathing. He was evaluated by speech language pathology on June 19 and had nonfluent aphasia.      PMH:  Past Medical History:   Diagnosis Date   • Smoker        PSH:  Past Surgical History:   Procedure Laterality Date   • INGUINAL HERNIA REPAIR  2/14/2011    Performed by KELLY CHIN at SURGERY SAME DAY [facility] ORS   • OTHER      eye       MEDICATIONS:  Current Facility-Administered Medications   Medication Dose   • enoxaparin (LOVENOX) inj 40 mg   40 mg   • acetaminophen (TYLENOL) tablet 650 mg  650 mg   • ondansetron (ZOFRAN) syringe/vial injection 4 mg  4 mg   • ondansetron (ZOFRAN ODT) dispertab 4 mg  4 mg   • senna-docusate (PERICOLACE or SENOKOT S) 8.6-50 MG per tablet 2 Tab  2 Tab    And   • polyethylene glycol/lytes (MIRALAX) PACKET 1 Packet  1 Packet    And   • magnesium hydroxide (MILK OF MAGNESIA) suspension 30 mL  30 mL    And   • bisacodyl (DULCOLAX) suppository 10 mg  10 mg   • atorvastatin (LIPITOR) tablet 80 mg  80 mg   • labetalol (NORMODYNE,TRANDATE) injection 10 mg  10 mg    Or   • hydrALAZINE (APRESOLINE) injection 10 mg  10 mg   • aspirin (ASA) tablet 325 mg  325 mg    Or   • aspirin (ASA) chewable tab 324 mg  324 mg    Or   • aspirin (ASA) suppository 300 mg  300 mg       ALLERGIES:  Patient has no known allergies.    PSYCHOSOCIAL HISTORY:  He lives in a one-story home with his significant other.      The patient presents with cognition, self cares and speech deficits and Minimal  de-conditioning. Pre-morbidly, this patient lived in a single level home with None steps to enter. The patient was evaluated by acute care Physical Therapy, Occupational Therapy and Speech Language Pathology; currently requiring supervision assistance for mobility and moderate assistance for ADLs, also with ongoing speech deficits. The patient's current diet is Regular with Thin liquids.       DISCUSSION AND RECOMMENDATIONS:  The patient is a 68-year-old male with suspected cardioembolic stroke    The patient is a good candidate for an acute inpatient rehabilitation program with a coordinated program of care at an intensity and frequency not available at a lower level of care.      Based on the current documentation of his functional level, the patient would be best served by admission to an inpatient rehabilitation hospital. He would need supervision support at discharge.    Note: if the patient continues to improve while waiting for medical clearance/insurance  authorization, and no longer requires 2 of of 3 therapy services (PT/OT/SLP), the patient will no longer meet criteria for acute inpatient rehabilitation.        This recommendation is substantiated by the patient's current medical condition with intervention and assessment of medical issues requiring an acute level of care for patient's safety and maximum outcome. A coordinated program of care will be provided by an interdisciplinary team including occupational therapy, speech language pathology, physiatry, rehab nursing and rehab psychology. Rehab goals include improved cognition and speech, mobility, self-care management, strength and conditioning/endurance, pain management, bowel and bladder management, mood and affect, and safety with independent home management including caregiver training.     Estimated length of stay is approximately 14 days. Rehab potential: Very Good. Disposition: to pre-morbid independent living setting with supportive care of patient's significant other. We will continue to follow with you in anticipation of discharge to acute inpatient rehabilitation when medically stable to do so at the discretion of him attending physician. Thank you for the consultation. Please call with any questions regarding this recommendation.    Xavi Cedillo M.D.  Spinal Cord Injury Medicine  6/20/2018

## 2018-06-20 NOTE — PROGRESS NOTES
Assumed care of patient at 0700.  Report received at bedside.  Patient is A&O x 4 with severe expressive aphasia.  Expected to have loop recorder placed this afternoon.  Hourly rounding in place.

## 2018-06-20 NOTE — DISCHARGE PLANNING
Spoke with Jason, son and he is agreeable with [facility].  Transport has been changed to 1400.  BILLY Camacho cell.

## 2018-06-20 NOTE — CARE PLAN
Problem: Communication  Goal: The ability to communicate needs accurately and effectively will improve  Outcome: PROGRESSING AS EXPECTED  Pt is able to communicate his needs.    Problem: Safety  Goal: Will remain free from injury  Outcome: PROGRESSING AS EXPECTED  Ambulates with steady gait

## 2018-06-21 ENCOUNTER — APPOINTMENT (OUTPATIENT)
Dept: RADIOLOGY | Facility: REHABILITATION | Age: 68
DRG: 057 | End: 2018-06-21
Attending: PHYSICAL MEDICINE & REHABILITATION
Payer: MEDICARE

## 2018-06-21 PROBLEM — E55.9 VITAMIN D DEFICIENCY: Status: ACTIVE | Noted: 2018-06-21

## 2018-06-21 LAB
25(OH)D3 SERPL-MCNC: 14 NG/ML (ref 30–100)
ALBUMIN SERPL BCP-MCNC: 3.7 G/DL (ref 3.2–4.9)
ALBUMIN/GLOB SERPL: 1.5 G/DL
ALP SERPL-CCNC: 70 U/L (ref 30–99)
ALT SERPL-CCNC: 14 U/L (ref 2–50)
ANION GAP SERPL CALC-SCNC: 6 MMOL/L (ref 0–11.9)
AST SERPL-CCNC: 18 U/L (ref 12–45)
BASOPHILS # BLD AUTO: 0.6 % (ref 0–1.8)
BASOPHILS # BLD: 0.04 K/UL (ref 0–0.12)
BILIRUB SERPL-MCNC: 0.6 MG/DL (ref 0.1–1.5)
BUN SERPL-MCNC: 20 MG/DL (ref 8–22)
CALCIUM SERPL-MCNC: 9.1 MG/DL (ref 8.5–10.5)
CHLORIDE SERPL-SCNC: 111 MMOL/L (ref 96–112)
CO2 SERPL-SCNC: 24 MMOL/L (ref 20–33)
CREAT SERPL-MCNC: 0.88 MG/DL (ref 0.5–1.4)
EOSINOPHIL # BLD AUTO: 0.15 K/UL (ref 0–0.51)
EOSINOPHIL NFR BLD: 2.2 % (ref 0–6.9)
ERYTHROCYTE [DISTWIDTH] IN BLOOD BY AUTOMATED COUNT: 42.3 FL (ref 35.9–50)
GLOBULIN SER CALC-MCNC: 2.4 G/DL (ref 1.9–3.5)
GLUCOSE SERPL-MCNC: 95 MG/DL (ref 65–99)
HCT VFR BLD AUTO: 44.1 % (ref 42–52)
HGB BLD-MCNC: 15.1 G/DL (ref 14–18)
IMM GRANULOCYTES # BLD AUTO: 0.01 K/UL (ref 0–0.11)
IMM GRANULOCYTES NFR BLD AUTO: 0.1 % (ref 0–0.9)
LYMPHOCYTES # BLD AUTO: 1.78 K/UL (ref 1–4.8)
LYMPHOCYTES NFR BLD: 26.3 % (ref 22–41)
MAGNESIUM SERPL-MCNC: 2.1 MG/DL (ref 1.5–2.5)
MCH RBC QN AUTO: 30.3 PG (ref 27–33)
MCHC RBC AUTO-ENTMCNC: 34.2 G/DL (ref 33.7–35.3)
MCV RBC AUTO: 88.6 FL (ref 81.4–97.8)
MONOCYTES # BLD AUTO: 0.67 K/UL (ref 0–0.85)
MONOCYTES NFR BLD AUTO: 9.9 % (ref 0–13.4)
NEUTROPHILS # BLD AUTO: 4.13 K/UL (ref 1.82–7.42)
NEUTROPHILS NFR BLD: 60.9 % (ref 44–72)
NRBC # BLD AUTO: 0 K/UL
NRBC BLD-RTO: 0 /100 WBC
PHOSPHATE SERPL-MCNC: 2.9 MG/DL (ref 2.5–4.5)
PLATELET # BLD AUTO: 194 K/UL (ref 164–446)
PMV BLD AUTO: 10.6 FL (ref 9–12.9)
POTASSIUM SERPL-SCNC: 3.9 MMOL/L (ref 3.6–5.5)
PREALB SERPL-MCNC: 19 MG/DL (ref 18–38)
PROT SERPL-MCNC: 6.1 G/DL (ref 6–8.2)
RBC # BLD AUTO: 4.98 M/UL (ref 4.7–6.1)
SODIUM SERPL-SCNC: 141 MMOL/L (ref 135–145)
TSH SERPL DL<=0.005 MIU/L-ACNC: 3.13 UIU/ML (ref 0.38–5.33)
WBC # BLD AUTO: 6.8 K/UL (ref 4.8–10.8)

## 2018-06-21 PROCEDURE — 99232 SBSQ HOSP IP/OBS MODERATE 35: CPT | Performed by: PHYSICAL MEDICINE & REHABILITATION

## 2018-06-21 PROCEDURE — 36415 COLL VENOUS BLD VENIPUNCTURE: CPT

## 2018-06-21 PROCEDURE — 73630 X-RAY EXAM OF FOOT: CPT | Mod: RT

## 2018-06-21 PROCEDURE — 770010 HCHG ROOM/CARE - REHAB SEMI PRIVAT*

## 2018-06-21 PROCEDURE — 80053 COMPREHEN METABOLIC PANEL: CPT

## 2018-06-21 PROCEDURE — A9270 NON-COVERED ITEM OR SERVICE: HCPCS | Performed by: PHYSICAL MEDICINE & REHABILITATION

## 2018-06-21 PROCEDURE — 82306 VITAMIN D 25 HYDROXY: CPT

## 2018-06-21 PROCEDURE — 97165 OT EVAL LOW COMPLEX 30 MIN: CPT

## 2018-06-21 PROCEDURE — 700112 HCHG RX REV CODE 229: Performed by: PHYSICAL MEDICINE & REHABILITATION

## 2018-06-21 PROCEDURE — 96105 ASSESSMENT OF APHASIA: CPT

## 2018-06-21 PROCEDURE — 97530 THERAPEUTIC ACTIVITIES: CPT

## 2018-06-21 PROCEDURE — 85025 COMPLETE CBC W/AUTO DIFF WBC: CPT

## 2018-06-21 PROCEDURE — 84100 ASSAY OF PHOSPHORUS: CPT

## 2018-06-21 PROCEDURE — 83735 ASSAY OF MAGNESIUM: CPT

## 2018-06-21 PROCEDURE — 97535 SELF CARE MNGMENT TRAINING: CPT

## 2018-06-21 PROCEDURE — 700111 HCHG RX REV CODE 636 W/ 250 OVERRIDE (IP): Performed by: PHYSICAL MEDICINE & REHABILITATION

## 2018-06-21 PROCEDURE — 92610 EVALUATE SWALLOWING FUNCTION: CPT

## 2018-06-21 PROCEDURE — 97161 PT EVAL LOW COMPLEX 20 MIN: CPT

## 2018-06-21 PROCEDURE — 700102 HCHG RX REV CODE 250 W/ 637 OVERRIDE(OP): Performed by: PHYSICAL MEDICINE & REHABILITATION

## 2018-06-21 PROCEDURE — 84443 ASSAY THYROID STIM HORMONE: CPT

## 2018-06-21 PROCEDURE — 84134 ASSAY OF PREALBUMIN: CPT

## 2018-06-21 RX ORDER — ATORVASTATIN CALCIUM 40 MG/1
80 TABLET, FILM COATED ORAL EVERY EVENING
Status: DISCONTINUED | OUTPATIENT
Start: 2018-06-21 | End: 2018-06-28 | Stop reason: HOSPADM

## 2018-06-21 RX ADMIN — ATORVASTATIN CALCIUM 80 MG: 40 TABLET, FILM COATED ORAL at 19:43

## 2018-06-21 RX ADMIN — VITAMIN D, TAB 1000IU (100/BT) 2000 UNITS: 25 TAB at 11:26

## 2018-06-21 RX ADMIN — ASPIRIN 325 MG ORAL TABLET 325 MG: 325 PILL ORAL at 08:26

## 2018-06-21 RX ADMIN — STANDARDIZED SENNA CONCENTRATE AND DOCUSATE SODIUM 1 TABLET: 8.6; 5 TABLET, FILM COATED ORAL at 19:46

## 2018-06-21 RX ADMIN — ENOXAPARIN SODIUM 40 MG: 100 INJECTION SUBCUTANEOUS at 08:26

## 2018-06-21 RX ADMIN — DOCUSATE SODIUM 100 MG: 100 CAPSULE, LIQUID FILLED ORAL at 08:26

## 2018-06-21 ASSESSMENT — ACTIVITIES OF DAILY LIVING (ADL): TOILETING: INDEPENDENT

## 2018-06-21 ASSESSMENT — BRIEF INTERVIEW FOR MENTAL STATUS (BIMS)
WHAT DAY OF THE WEEK IS IT: INCORRECT
WHAT YEAR IS IT: CORRECT
WHAT MONTH IS IT: MISSED BY MORE THAN 1 MONTH
INITIAL REPETITION OF BED BLUE SOCK - FIRST ATTEMPT: 3
ASKED TO RECALL BLUE: YES, AFTER CUEING (A COLOR")"
ASKED TO RECALL BED: NO, COULD NOT RECALL
ASKED TO RECALL SOCK: NO, COULD NOT RECALL
BIMS SUMMARY SCORE: 7

## 2018-06-21 ASSESSMENT — PAIN SCALES - GENERAL
PAINLEVEL_OUTOF10: 0

## 2018-06-21 NOTE — IDT DISCHARGE PLANNING
CASE MANAGEMENT INITIAL ASSESSMENT    Admit Date:  6/20/2018     I met with patient to discuss role of case management / discharge planning / team conference.  Patient is a  68 y.o. male transferred from [facility].  He is alert and able to provide information.  Speech is somewhat impaired but understandable.  Patient's admitting physician for rehab is Dr. Cedillo.  I have received report from physician that patient has been discovered to have a rt foot fracture.  Per his request, I have contacted [facility] and requested outpatient orthopaedic consultation.  I am awaiting call back.    Diagnosis: 01.2 (R) Body Involvement (L) Brain  Stroke (HCC)    Co-morbidities:   Patient Active Problem List    Diagnosis Date Noted   • Vitamin D deficiency 06/21/2018   • Hyperlipidemia 06/18/2018   • Aphasia due to acute cerebrovascular accident (CVA) ([facility]) 06/18/2018   • Acute CVA (cerebrovascular accident) ([facility]) 06/17/2018   • Essential hypertension 06/17/2018     Prior Living Situation:  Housing / Facility: [address]  Lives with - Patient's Self Care Capacity: Significant Other    Prior Level of Function:  Medication Management: Independent  Finances: Independent  Home Management: Independent  Shopping: Independent  Prior Level Of Mobility: Independent Without Device in Community  Driving / Transportation: Driving Independent    Support Systems:  Primary : Son is Jason Huffman at [phone]  Other support systems: Significant other is Rosanne Meléndez at [phone]     Previous Services Utilized:   Equipment Owned: None  Prior Services: Home-Independent    Other Information:  Occupation (Pre-Hospital Vocational): Retired Due To Age  Primary Payor Source: Medicare A  Primary Care Practitioner : none  Other MDs: Nakul Reeves for Cardiology, Gregory Virgen for neurology    Additional Case Management Questions:  Have you ever received case management services for yourself or a family member?  No    Do you  feel you have an an understanding of of what services  provide?  Patient acknowledges after discussion, yes.    Do you have any additional questions regarding case management?  No       Patient / Family Goal:  Patient / Family Goal: Patient plans to return home with his significant other and her son.  His son also lives locally and is supportive.  He will need follow up with cardiology and neurology.  He does not currently have a PCP.  I will assist with this.  He will need follow up therapy and his dme needs are yet to be determined.    Plan:  1. Continue to follow patient through hospitalization and provide discharge planning in collaboration with patient, family, physicians and ancillary services.     2. Utilize community resources to ensure a safe discharge.

## 2018-06-21 NOTE — PROGRESS NOTES
Rehab Progress Note     Encounter date: 6/21/2018  Today I met with the patient face to face in the hallway and later in the therapy gym  Chief Complaint:  Acute CVA (cerebrovascular accident) (HCC) , right foot pain    Interval Events (subjective)  Mr. Mari reports that he is doing well overall today, but he is now able to report that he is having right foot pain. He states that the pain began immediately after his stroke when he fell due to his right hemiparesis. He reports that he stubbed his toe on a stool at home. The pain is now worse with weightbearing. It is sharp and radiates from the mid foot distally over the right second metatarsal.  He denies any fevers, chills, coughing, choking, chest pain, or shortness of breath.    Objective:  VITAL SIGNS: /72   Pulse 65   Temp 36.8 °C (98.2 °F)   Resp 18   Ht 1.829 m (6')   Wt 95 kg (209 lb 8 oz)   SpO2 96%   BMI 28.41 kg/m²     Recent Results (from the past 72 hour(s))   CBC with Differential    Collection Time: 06/21/18  5:27 AM   Result Value Ref Range    WBC 6.8 4.8 - 10.8 K/uL    RBC 4.98 4.70 - 6.10 M/uL    Hemoglobin 15.1 14.0 - 18.0 g/dL    Hematocrit 44.1 42.0 - 52.0 %    MCV 88.6 81.4 - 97.8 fL    MCH 30.3 27.0 - 33.0 pg    MCHC 34.2 33.7 - 35.3 g/dL    RDW 42.3 35.9 - 50.0 fL    Platelet Count 194 164 - 446 K/uL    MPV 10.6 9.0 - 12.9 fL    Neutrophils-Polys 60.90 44.00 - 72.00 %    Lymphocytes 26.30 22.00 - 41.00 %    Monocytes 9.90 0.00 - 13.40 %    Eosinophils 2.20 0.00 - 6.90 %    Basophils 0.60 0.00 - 1.80 %    Immature Granulocytes 0.10 0.00 - 0.90 %    Nucleated RBC 0.00 /100 WBC    Neutrophils (Absolute) 4.13 1.82 - 7.42 K/uL    Lymphs (Absolute) 1.78 1.00 - 4.80 K/uL    Monos (Absolute) 0.67 0.00 - 0.85 K/uL    Eos (Absolute) 0.15 0.00 - 0.51 K/uL    Baso (Absolute) 0.04 0.00 - 0.12 K/uL    Immature Granulocytes (abs) 0.01 0.00 - 0.11 K/uL    NRBC (Absolute) 0.00 K/uL   Comp Metabolic Panel (CMP)    Collection Time: 06/21/18   5:27 AM   Result Value Ref Range    Sodium 141 135 - 145 mmol/L    Potassium 3.9 3.6 - 5.5 mmol/L    Chloride 111 96 - 112 mmol/L    Co2 24 20 - 33 mmol/L    Anion Gap 6.0 0.0 - 11.9    Glucose 95 65 - 99 mg/dL    Bun 20 8 - 22 mg/dL    Creatinine 0.88 0.50 - 1.40 mg/dL    Calcium 9.1 8.5 - 10.5 mg/dL    AST(SGOT) 18 12 - 45 U/L    ALT(SGPT) 14 2 - 50 U/L    Alkaline Phosphatase 70 30 - 99 U/L    Total Bilirubin 0.6 0.1 - 1.5 mg/dL    Albumin 3.7 3.2 - 4.9 g/dL    Total Protein 6.1 6.0 - 8.2 g/dL    Globulin 2.4 1.9 - 3.5 g/dL    A-G Ratio 1.5 g/dL   Magnesium    Collection Time: 06/21/18  5:27 AM   Result Value Ref Range    Magnesium 2.1 1.5 - 2.5 mg/dL   Phosphorus    Collection Time: 06/21/18  5:27 AM   Result Value Ref Range    Phosphorus 2.9 2.5 - 4.5 mg/dL   Prealbumin    Collection Time: 06/21/18  5:27 AM   Result Value Ref Range    Pre-Albumin 19.0 18.0 - 38.0 mg/dL   TSH with Reflex to FT4    Collection Time: 06/21/18  5:27 AM   Result Value Ref Range    TSH 3.130 0.380 - 5.330 uIU/mL   Vitamin D, 25-hydroxy (blood)    Collection Time: 06/21/18  5:27 AM   Result Value Ref Range    25-Hydroxy   Vitamin D 25 14 (L) 30 - 100 ng/mL   ESTIMATED GFR    Collection Time: 06/21/18  5:27 AM   Result Value Ref Range    GFR If African American >60 >60 mL/min/1.73 m 2    GFR If Non African American >60 >60 mL/min/1.73 m 2       Current Facility-Administered Medications   Medication Frequency   • enoxaparin (LOVENOX) inj 40 mg DAILY   • atorvastatin (LIPITOR) tablet 80 mg Q EVENING   • vitamin D (cholecalciferol) tablet 2,000 Units DAILY   • Respiratory Care per Protocol Continuous RT   • Pharmacy Consult Request ...Pain Management Review 1 Each PRN   • acetaminophen (TYLENOL) tablet 650 mg Q4HRS PRN   • artificial tears 1.4 % ophthalmic solution 1 Drop PRN   • benzocaine-menthol (CEPACOL) lozenge 1 Lozenge Q2HRS PRN   • hydrALAZINE (APRESOLINE) tablet 25 mg Q8HRS PRN   • mag hydrox-al hydrox-simeth (MAALOX PLUS ES or  MYLANTA DS) suspension 20 mL Q2HRS PRN   • ondansetron (ZOFRAN ODT) dispertab 4 mg 4X/DAY PRN    Or   • ondansetron (ZOFRAN) syringe/vial injection 4 mg 4X/DAY PRN   • traZODone (DESYREL) tablet 50 mg QHS PRN   • sodium chloride (OCEAN) 0.65 % nasal spray 2 Spray PRN   • aspirin (ASA) tablet 325 mg DAILY   • docusate sodium (COLACE) capsule 100 mg DAILY    And   • senna-docusate (PERICOLACE or SENOKOT S) 8.6-50 MG per tablet 1 Tab QDAY PRN    And   • lactulose 20 GM/30ML solution 30 mL Q24HRS PRN    And   • bisacodyl (DULCOLAX) suppository 10 mg QDAY PRN       Exam Date: 6/21/2018    General:  Awake, alert, oriented, no acute distress  HEENT:  Stable mild facial droop  Cardiac: regular rate and rhythm  Lungs: clear to auscultation bilaterally.   Abdomen: soft; non tender, non distended, bowel sounds present and normoactive  Musculoskeletal: There is significant bruising present over the second proximal phalanx in the right foot and over the dorsum of the right foot near the second metatarsal head  Neuro:   Continues to struggle with expressive aphasia and paraphasic errors.    Orders Placed This Encounter   Procedures   • Diet Order     Standing Status:   Standing     Number of Occurrences:   1     Order Specific Question:   Diet:     Answer:   Regular [1]       Assessment:  Active Hospital Problems    Diagnosis   • *Acute CVA (cerebrovascular accident) (HCC)   • Vitamin D deficiency   • Aphasia due to acute cerebrovascular accident (CVA) (HCC)   • Hyperlipidemia   • Essential hypertension       Medical Decision Making and Plan:  THIS PATIENT IS ON THE STROKE PATHWAY     Mr. Mari is a 68 year old male admitted for rehabilitation on June 20 following ischemic strokes     Left frontal, parietal, temporal, occipital, and basal ganglia ischemic strokes presumed cardioembolic source   Aphasia  Alexia  Right Hemiparesis (dominant side)  Continue comprehensive rehabilitation    After evaluation, physical and  occupational therapy will both dropped down to 30 minute sessions a day and increase speech and language pathology to 2 hours daily    Will need cardiology follow-up to assess loop recorder results    Aspirin 325 mg daily     Hypertension  Systolic blood pressure range in the last 24 hours is 122-154  Continue to monitor and initiate treatment if he remains hypertensive    Right second metatarsal fracture  As the patient's language skills have improved, he reported right foot pain due to the fall that he initially sustained due to his hemiparesis from the stroke.  X-ray obtained today shows fractures at the proximal right second metatarsal and metatarsal head  Possible osteonecrosis  Requesting orthopedic evaluation  Rigid postoperative boot     Hyperlipidemia  Lipitor 80 mg daily at bedtime     Vitamin D deficiency  Vitamin D was 14 on admission, so we begin supplementation with cholecalciferol 2000 units daily    DVT prophylaxis  Lovenox 40 g daily     Estimated discharge: 14-21 days    Total time:  30 minutes.  I spent greater than 50% of the time for patient care, counseling, and coordination on this date, including unit/floor time, and face-to-face time with the patient as per interval events and assessment and plan above. Topics discussed included initiation of therapy, vitamin D, adjustment of therapy schedule, hypertension, and results of foot x-ray.  We discussed referral to orthopedic surgery for evaluation      Xavi Cedillo M.D.  6/21/2018

## 2018-06-21 NOTE — CARE PLAN
Problem: Communication  Goal: The ability to communicate needs accurately and effectively will improve  Outcome: PROGRESSING AS EXPECTED  Pt is able to effectively communicate his needs to staff.    Problem: Safety  Goal: Will remain free from injury  Outcome: PROGRESSING SLOWER THAN EXPECTED  Pt does not always use call light before attempting to transfer himself. Use of call light reinforced with each encounter and alarms in place for safety.     Problem: Infection  Goal: Will remain free from infection  Outcome: PROGRESSING AS EXPECTED  No s/s of infection present    Problem: Bowel/Gastric:  Goal: Normal bowel function is maintained or improved  Outcome: PROGRESSING SLOWER THAN EXPECTED  Last BM 6/18/18 per report. Pt states that he only has a BM 2-3 times a week. Will offer PRN bowel medication in AM when pt wakes.     Problem: Respiratory:  Goal: Respiratory status will improve  Outcome: PROGRESSING AS EXPECTED  Pt is on room air and respirations are WNL.

## 2018-06-21 NOTE — CARE PLAN
Problem: Safety  Goal: Will remain free from falls  Outcome: PROGRESSING AS EXPECTED  Patient uses call light consistently and appropriately this shift.  Waits for assistance when needed and does not attempt self transfer.  Able to verbalize needs.  Will continue to monitor.    Problem: Venous Thromboembolism (VTW)/Deep Vein Thrombosis (DVT) Prevention:  Goal: Patient will participate in Venous Thrombosis (VTE)/Deep Vein Thrombosis (DVT)Prevention Measures  Outcome: PROGRESSING AS EXPECTED  Pt is up and walking, participates in therapies, and up to dinning room for all meals. Also on Lovenox for DVT prophylasis.

## 2018-06-21 NOTE — THERAPY
Physical Therapy Initial Plan of Care Note    1) Assessment: Pt is a 67 y/o male with an admitting diagnosis of CVA. Refer to H&P for details. Pt presents to PT with impaired balance, endurance, obstacle avoidance and pathfinding. Pt would benefit from skilled PT services while in house to address these concerns and return to [place], [place] with support of significant other .  Additional factors influencing patient status / progress (ie: cognitive factors, co-morbidities, social support, etc): Retired in [place], [place], has spouse for support who is also retired, independent PLOF  Long term and short term goals have been discussed with patient and they are in agreement.  2) Plan: Recommend Physical Therapy  minutes per day 5-7 days per week for 2-3 weeks for the following treatments:  PT Group Therapy, PT Gait Training, PT Therapeutic Exercises, PT Neuro Re-Ed/Balance, PT Aquatic Therapy, PT Therapeutic Activity, PT Manual Therapy and PT Evaluation.  3) Goals:  Please refer to care plan for goals.

## 2018-06-21 NOTE — THERAPY
Occupational Therapy Initial Plan of Care Note    1) Assessment:  Patient is 68 y.o. male with a diagnosis of left CVA.  Additional factors influencing patient status / progress (ie: cognitive factors, co-morbidities, social support, etc): lives with S.O., independent PLOF, motivated for therapy.  Long term and short term goals have been discussed with patient and they are in agreement.  2) Plan:  Recommend Occupational Therapy  minutes per day 5-7 days per week for 2 days for the following treatments:  OT Group Therapy, OT Self Care/ADL, OT Cognitive Skill Dev, OT Community Reintegration, OT Manual Ther Technique, OT Neuro Re-Ed/Balance, OT Therapeutic Activity, OT Evaluation and OT Therapeutic Exercise.  3) Goals:  Please refer to care plan for goals.

## 2018-06-22 PROCEDURE — 770010 HCHG ROOM/CARE - REHAB SEMI PRIVAT*

## 2018-06-22 PROCEDURE — 97530 THERAPEUTIC ACTIVITIES: CPT

## 2018-06-22 PROCEDURE — 700111 HCHG RX REV CODE 636 W/ 250 OVERRIDE (IP): Performed by: PHYSICAL MEDICINE & REHABILITATION

## 2018-06-22 PROCEDURE — 97112 NEUROMUSCULAR REEDUCATION: CPT

## 2018-06-22 PROCEDURE — 700102 HCHG RX REV CODE 250 W/ 637 OVERRIDE(OP): Performed by: PHYSICAL MEDICINE & REHABILITATION

## 2018-06-22 PROCEDURE — 99232 SBSQ HOSP IP/OBS MODERATE 35: CPT | Performed by: PHYSICAL MEDICINE & REHABILITATION

## 2018-06-22 PROCEDURE — A9270 NON-COVERED ITEM OR SERVICE: HCPCS | Performed by: PHYSICAL MEDICINE & REHABILITATION

## 2018-06-22 PROCEDURE — 92507 TX SP LANG VOICE COMM INDIV: CPT

## 2018-06-22 RX ORDER — LISINOPRIL 5 MG/1
2.5 TABLET ORAL
Status: DISCONTINUED | OUTPATIENT
Start: 2018-06-23 | End: 2018-06-28 | Stop reason: HOSPADM

## 2018-06-22 RX ORDER — LISINOPRIL 5 MG/1
2.5 TABLET ORAL
Status: DISCONTINUED | OUTPATIENT
Start: 2018-06-22 | End: 2018-06-22

## 2018-06-22 RX ADMIN — VITAMIN D, TAB 1000IU (100/BT) 2000 UNITS: 25 TAB at 08:41

## 2018-06-22 RX ADMIN — ASPIRIN 325 MG ORAL TABLET 325 MG: 325 PILL ORAL at 08:41

## 2018-06-22 RX ADMIN — ENOXAPARIN SODIUM 40 MG: 100 INJECTION SUBCUTANEOUS at 08:41

## 2018-06-22 RX ADMIN — ATORVASTATIN CALCIUM 80 MG: 40 TABLET, FILM COATED ORAL at 20:56

## 2018-06-22 ASSESSMENT — PAIN SCALES - GENERAL: PAINLEVEL_OUTOF10: 0

## 2018-06-22 NOTE — THERAPY
Speech Therapy Initial Plan of Care Note    1) Assessment:  Patient is 68 y.o. male with a diagnosis of left CVA.  Additional factors influencing patient status / progress (ie: cognitive factors, co-morbidities, social support, etc): patient is motivated to achieve independence.  Long term and short term goals have been discussed with patient and they are in agreement.  2) Plan:  Recommend Speech Therapy 30-60 minutes per day 5-6 days per week for 2 weeks for the following treatments:  SLP Speech Language Treatment, SLP Self Care / ADL Training , SLP Cognitive Skill Development and SLP Group Treatment.  3) Goals:  Please refer to care plan for goals.

## 2018-06-22 NOTE — REHAB-CM IDT TEAM NOTE
Case Management    DC Planning  DC destination/disposition:  Patient lives with his significant other in a single level home in [place].  He has 3 entry steps    Referrals: Orthopaedics for foot fracture    DC Needs: Patient is ambulating without a device.  He will need follow up outpatient therapy.  He will need follow up with cardiology and neurology/stroke bridge.      Barriers to discharge:       Strengths: good support and motivation.    Section completed by:  Migdalia Elam R.N.

## 2018-06-22 NOTE — CARE PLAN
Problem: Safety  Goal: Will remain free from injury  Patient able to verbalize needs.  Denies pain or discomfort this shift and no s/s same noted.  Will continue to monitor.    Problem: Venous Thromboembolism (VTW)/Deep Vein Thrombosis (DVT) Prevention:  Goal: Patient will participate in Venous Thrombosis (VTE)/Deep Vein Thrombosis (DVT)Prevention Measures  Pt on lovenox 40 mg daily for DVT prophylaxis

## 2018-06-22 NOTE — REHAB-PHARMACY IDT TEAM NOTE
Pharmacy   Pharmacy  Antibiotics/Antifungals/Antivirals:  Medication:      Active Orders     None        Route:        NA  Stop Date:  NA  Reason:      NA  Antihypertensives/Cardiac:  Medication:    Orders (72h ago through future)    Start     Ordered    06/23/18 0600  lisinopril (PRINIVIL) tablet 2.5 mg  Q DAY      06/22/18 1148    06/22/18 1215  lisinopril (PRINIVIL) tablet 2.5 mg  Q DAY,   Status:  Discontinued      06/22/18 1148    06/21/18 2100  atorvastatin (LIPITOR) tablet 80 mg  EVERY EVENING      06/21/18 0402    06/20/18 1526  hydrALAZINE (APRESOLINE) tablet 25 mg  EVERY 8 HOURS PRN      06/20/18 1526        Patient Vitals for the past 24 hrs:   BP Pulse   06/22/18 1400 130/84 72   06/22/18 0700 148/80 75   06/21/18 1940 144/84 76   06/21/18 1843 (!) 162/82 75       Anticoagulation:  Medication: Aspirin, Lovenox.    Other key medications: A review of the medication list reveals no issues at this time. Patient is currently on antihypertensive(s). Recommend home blood pressure monitoring/recording if antihypertensive(s) regimen(s) continue.    Section completed by: Gualberto Reese [facility]

## 2018-06-22 NOTE — PROGRESS NOTES
Rehab Progress Note     Encounter date: 6/22/2018  Today I met with the patient face to face in therapy   Chief Complaint:  Acute CVA (cerebrovascular accident) (HCC) , right foot pain    Interval Events (subjective)  Mr. Mari continues to report frustration with right foot pain. He reports that he is also having ongoing difficulty with expression and understanding of written language.  He reports that he is very worried that he will never be able to comprehend things again. He denies any fevers, chills, headache, dizziness, chest pain, or shortness of breath. We discussed orthopedic evaluation. We also discussed stroke recovery.    Objective:  VITAL SIGNS: /80   Pulse 75   Temp 36.7 °C (98 °F)   Resp 16   Ht 1.829 m (6')   Wt 95 kg (209 lb 8 oz)   SpO2 94%   BMI 28.41 kg/m²     Recent Results (from the past 72 hour(s))   CBC with Differential    Collection Time: 06/21/18  5:27 AM   Result Value Ref Range    WBC 6.8 4.8 - 10.8 K/uL    RBC 4.98 4.70 - 6.10 M/uL    Hemoglobin 15.1 14.0 - 18.0 g/dL    Hematocrit 44.1 42.0 - 52.0 %    MCV 88.6 81.4 - 97.8 fL    MCH 30.3 27.0 - 33.0 pg    MCHC 34.2 33.7 - 35.3 g/dL    RDW 42.3 35.9 - 50.0 fL    Platelet Count 194 164 - 446 K/uL    MPV 10.6 9.0 - 12.9 fL    Neutrophils-Polys 60.90 44.00 - 72.00 %    Lymphocytes 26.30 22.00 - 41.00 %    Monocytes 9.90 0.00 - 13.40 %    Eosinophils 2.20 0.00 - 6.90 %    Basophils 0.60 0.00 - 1.80 %    Immature Granulocytes 0.10 0.00 - 0.90 %    Nucleated RBC 0.00 /100 WBC    Neutrophils (Absolute) 4.13 1.82 - 7.42 K/uL    Lymphs (Absolute) 1.78 1.00 - 4.80 K/uL    Monos (Absolute) 0.67 0.00 - 0.85 K/uL    Eos (Absolute) 0.15 0.00 - 0.51 K/uL    Baso (Absolute) 0.04 0.00 - 0.12 K/uL    Immature Granulocytes (abs) 0.01 0.00 - 0.11 K/uL    NRBC (Absolute) 0.00 K/uL   Comp Metabolic Panel (CMP)    Collection Time: 06/21/18  5:27 AM   Result Value Ref Range    Sodium 141 135 - 145 mmol/L    Potassium 3.9 3.6 - 5.5 mmol/L     Chloride 111 96 - 112 mmol/L    Co2 24 20 - 33 mmol/L    Anion Gap 6.0 0.0 - 11.9    Glucose 95 65 - 99 mg/dL    Bun 20 8 - 22 mg/dL    Creatinine 0.88 0.50 - 1.40 mg/dL    Calcium 9.1 8.5 - 10.5 mg/dL    AST(SGOT) 18 12 - 45 U/L    ALT(SGPT) 14 2 - 50 U/L    Alkaline Phosphatase 70 30 - 99 U/L    Total Bilirubin 0.6 0.1 - 1.5 mg/dL    Albumin 3.7 3.2 - 4.9 g/dL    Total Protein 6.1 6.0 - 8.2 g/dL    Globulin 2.4 1.9 - 3.5 g/dL    A-G Ratio 1.5 g/dL   Magnesium    Collection Time: 06/21/18  5:27 AM   Result Value Ref Range    Magnesium 2.1 1.5 - 2.5 mg/dL   Phosphorus    Collection Time: 06/21/18  5:27 AM   Result Value Ref Range    Phosphorus 2.9 2.5 - 4.5 mg/dL   Prealbumin    Collection Time: 06/21/18  5:27 AM   Result Value Ref Range    Pre-Albumin 19.0 18.0 - 38.0 mg/dL   TSH with Reflex to FT4    Collection Time: 06/21/18  5:27 AM   Result Value Ref Range    TSH 3.130 0.380 - 5.330 uIU/mL   Vitamin D, 25-hydroxy (blood)    Collection Time: 06/21/18  5:27 AM   Result Value Ref Range    25-Hydroxy   Vitamin D 25 14 (L) 30 - 100 ng/mL   ESTIMATED GFR    Collection Time: 06/21/18  5:27 AM   Result Value Ref Range    GFR If African American >60 >60 mL/min/1.73 m 2    GFR If Non African American >60 >60 mL/min/1.73 m 2       Current Facility-Administered Medications   Medication Frequency   • enoxaparin (LOVENOX) inj 40 mg DAILY   • atorvastatin (LIPITOR) tablet 80 mg Q EVENING   • vitamin D (cholecalciferol) tablet 2,000 Units DAILY   • Respiratory Care per Protocol Continuous RT   • Pharmacy Consult Request ...Pain Management Review 1 Each PRN   • acetaminophen (TYLENOL) tablet 650 mg Q4HRS PRN   • artificial tears 1.4 % ophthalmic solution 1 Drop PRN   • benzocaine-menthol (CEPACOL) lozenge 1 Lozenge Q2HRS PRN   • hydrALAZINE (APRESOLINE) tablet 25 mg Q8HRS PRN   • mag hydrox-al hydrox-simeth (MAALOX PLUS ES or MYLANTA DS) suspension 20 mL Q2HRS PRN   • ondansetron (ZOFRAN ODT) dispertab 4 mg 4X/DAY PRN    Or   •  ondansetron (ZOFRAN) syringe/vial injection 4 mg 4X/DAY PRN   • traZODone (DESYREL) tablet 50 mg QHS PRN   • sodium chloride (OCEAN) 0.65 % nasal spray 2 Spray PRN   • aspirin (ASA) tablet 325 mg DAILY   • docusate sodium (COLACE) capsule 100 mg DAILY    And   • senna-docusate (PERICOLACE or SENOKOT S) 8.6-50 MG per tablet 1 Tab QDAY PRN    And   • lactulose 20 GM/30ML solution 30 mL Q24HRS PRN    And   • bisacodyl (DULCOLAX) suppository 10 mg QDAY PRN       Exam Date: 6/22/2018    General:  Awake, alert, oriented, no acute distress  HEENT:  Stable mild right facial droop  Cardiac: regular rate and rhythm  Lungs: clear to auscultation bilaterally.   Abdomen: soft; non tender, non distended, bowel sounds present and normoactive  Extremities: No edema in the bilateral lower limbs  Musculoskeletal: persistent bruising over the right foot  Neuro:   Ongoing expressive aphasia with paraphasic errors. He was able to correctly read approximately 50% of a newspaper headline today.    Seen ambulating with no assistive device. He does continue to have an ataxic gait with uncompensated right-sided Trendelenburg    Orders Placed This Encounter   Procedures   • Diet Order     Standing Status:   Standing     Number of Occurrences:   1     Order Specific Question:   Diet:     Answer:   Regular [1]       Assessment:  Active Hospital Problems    Diagnosis   • *Acute CVA (cerebrovascular accident) (HCC)   • Vitamin D deficiency   • Aphasia due to acute cerebrovascular accident (CVA) (HCC)   • Hyperlipidemia   • Essential hypertension       Medical Decision Making and Plan:  THIS PATIENT IS ON THE STROKE PATHWAY     Mr. Mari is a 68 year old male admitted for rehabilitation on June 20 following ischemic strokes     Left frontal, parietal, temporal, occipital, and basal ganglia ischemic strokes presumed cardioembolic source   Aphasia  Alexia  Right Hemiparesis (dominant side)  Continue comprehensive rehabilitation    Will need  cardiology follow-up to assess loop recorder results    Aspirin 325 mg daily     Hypertension  Systolic blood pressure range in the last 24 hours is 128-148 mmHg  Initiating treatment with lisinopril 2.5 mg on 6/23  Continue to monitor    Right second metatarsal fracture  Outpatient orthopedic visit today     Hyperlipidemia  Lipitor 80 mg daily at bedtime     Vitamin D deficiency  Vitamin D was 14 on admission, so we begin supplementation with cholecalciferol 2000 units daily    DVT prophylaxis  Lovenox 40 g daily     Estimated discharge: 14-21 days    Total time:  30 minutes.  I spent greater than 50% of the time for patient care, counseling, and coordination on this date, including unit/floor time, and face-to-face time with the patient as per interval events and assessment and plan above. Topics discussed included coordination of outpatient orthopedic follow-up, discussion of stroke recovery, discussion of functional progress, discussion of fracture      Xavi Cedillo M.D.  6/22/2018

## 2018-06-22 NOTE — PROGRESS NOTES
Patient notified of plan to send patient to the [facility].  Transport set up and patient will be escorted with a CNA.  Therapies put on hold for appt.

## 2018-06-22 NOTE — DISCHARGE PLANNING
Case Management:  Spoke with scheduling at [facility] for a orthopaedic consultation for foot fracture.  Foot specialist are not open until August but they confirmed patient could be seen today at [facility].  Physician requests we proceed with sending patient there for evaluation.  I have updated charge nurse and will ask him to proceed with this.  They are open until 7 pm.  Patient has been updated.

## 2018-06-22 NOTE — CARE PLAN
Problem: Communication  Goal: The ability to communicate needs accurately and effectively will improve  Outcome: PROGRESSING AS EXPECTED  Pt is able to effectively communicate his needs to staff.    Problem: Safety  Goal: Will remain free from injury  Outcome: PROGRESSING AS EXPECTED  Pt uses call light consistently for staff assistance. Pt has good safety awareness, no impulsivity observed.    Problem: Infection  Goal: Will remain free from infection  Outcome: PROGRESSING AS EXPECTED  No s/s of infection present    Problem: Venous Thromboembolism (VTW)/Deep Vein Thrombosis (DVT) Prevention:  Goal: Patient will participate in Venous Thrombosis (VTE)/Deep Vein Thrombosis (DVT)Prevention Measures  Outcome: PROGRESSING AS EXPECTED  Lovenox SC injections for DVT prophylaxis.     Problem: Bowel/Gastric:  Goal: Normal bowel function is maintained or improved  Outcome: PROGRESSING AS EXPECTED  Pt is continent of bowel and had a BM this shift.     Problem: Respiratory:  Goal: Respiratory status will improve  Outcome: PROGRESSING AS EXPECTED  Pt is on room air and respirations are WNL.

## 2018-06-23 PROCEDURE — 97530 THERAPEUTIC ACTIVITIES: CPT

## 2018-06-23 PROCEDURE — 700111 HCHG RX REV CODE 636 W/ 250 OVERRIDE (IP): Performed by: PHYSICAL MEDICINE & REHABILITATION

## 2018-06-23 PROCEDURE — 97112 NEUROMUSCULAR REEDUCATION: CPT

## 2018-06-23 PROCEDURE — 770010 HCHG ROOM/CARE - REHAB SEMI PRIVAT*

## 2018-06-23 PROCEDURE — 92507 TX SP LANG VOICE COMM INDIV: CPT

## 2018-06-23 PROCEDURE — 97110 THERAPEUTIC EXERCISES: CPT

## 2018-06-23 PROCEDURE — A9270 NON-COVERED ITEM OR SERVICE: HCPCS | Performed by: PHYSICAL MEDICINE & REHABILITATION

## 2018-06-23 PROCEDURE — 97116 GAIT TRAINING THERAPY: CPT

## 2018-06-23 PROCEDURE — 700102 HCHG RX REV CODE 250 W/ 637 OVERRIDE(OP): Performed by: PHYSICAL MEDICINE & REHABILITATION

## 2018-06-23 PROCEDURE — 700112 HCHG RX REV CODE 229: Performed by: PHYSICAL MEDICINE & REHABILITATION

## 2018-06-23 RX ADMIN — ATORVASTATIN CALCIUM 80 MG: 40 TABLET, FILM COATED ORAL at 19:45

## 2018-06-23 RX ADMIN — ENOXAPARIN SODIUM 40 MG: 100 INJECTION SUBCUTANEOUS at 08:36

## 2018-06-23 RX ADMIN — VITAMIN D, TAB 1000IU (100/BT) 2000 UNITS: 25 TAB at 08:37

## 2018-06-23 RX ADMIN — ASPIRIN 325 MG ORAL TABLET 325 MG: 325 PILL ORAL at 08:37

## 2018-06-23 RX ADMIN — DOCUSATE SODIUM 100 MG: 100 CAPSULE, LIQUID FILLED ORAL at 08:37

## 2018-06-23 RX ADMIN — LISINOPRIL 2.5 MG: 5 TABLET ORAL at 05:33

## 2018-06-23 ASSESSMENT — PAIN SCALES - GENERAL
PAINLEVEL_OUTOF10: 0

## 2018-06-23 ASSESSMENT — PATIENT HEALTH QUESTIONNAIRE - PHQ9
2. FEELING DOWN, DEPRESSED, IRRITABLE, OR HOPELESS: NOT AT ALL
1. LITTLE INTEREST OR PLEASURE IN DOING THINGS: NOT AT ALL
SUM OF ALL RESPONSES TO PHQ9 QUESTIONS 1 AND 2: 0

## 2018-06-23 NOTE — CARE PLAN
Problem: Infection  Goal: Will remain free from infection  No acute signs or symptoms of infection during shift assessment.    Problem: Bowel/Gastric:  Goal: Normal bowel function is maintained or improved  Patient taking scheduled bowel medication; bowel sounds WNL x4 quadrants and abdomin soft and non-tender.

## 2018-06-24 PROCEDURE — 97112 NEUROMUSCULAR REEDUCATION: CPT

## 2018-06-24 PROCEDURE — 97116 GAIT TRAINING THERAPY: CPT

## 2018-06-24 PROCEDURE — 700112 HCHG RX REV CODE 229: Performed by: PHYSICAL MEDICINE & REHABILITATION

## 2018-06-24 PROCEDURE — A9270 NON-COVERED ITEM OR SERVICE: HCPCS | Performed by: PHYSICAL MEDICINE & REHABILITATION

## 2018-06-24 PROCEDURE — 97110 THERAPEUTIC EXERCISES: CPT

## 2018-06-24 PROCEDURE — 700111 HCHG RX REV CODE 636 W/ 250 OVERRIDE (IP): Performed by: PHYSICAL MEDICINE & REHABILITATION

## 2018-06-24 PROCEDURE — 770010 HCHG ROOM/CARE - REHAB SEMI PRIVAT*

## 2018-06-24 PROCEDURE — 97530 THERAPEUTIC ACTIVITIES: CPT

## 2018-06-24 PROCEDURE — 92507 TX SP LANG VOICE COMM INDIV: CPT

## 2018-06-24 PROCEDURE — 700102 HCHG RX REV CODE 250 W/ 637 OVERRIDE(OP): Performed by: PHYSICAL MEDICINE & REHABILITATION

## 2018-06-24 RX ADMIN — ASPIRIN 325 MG ORAL TABLET 325 MG: 325 PILL ORAL at 08:13

## 2018-06-24 RX ADMIN — VITAMIN D, TAB 1000IU (100/BT) 2000 UNITS: 25 TAB at 08:13

## 2018-06-24 RX ADMIN — LISINOPRIL 2.5 MG: 5 TABLET ORAL at 05:57

## 2018-06-24 RX ADMIN — DOCUSATE SODIUM 100 MG: 100 CAPSULE, LIQUID FILLED ORAL at 08:13

## 2018-06-24 RX ADMIN — ENOXAPARIN SODIUM 40 MG: 100 INJECTION SUBCUTANEOUS at 08:14

## 2018-06-24 RX ADMIN — ATORVASTATIN CALCIUM 80 MG: 40 TABLET, FILM COATED ORAL at 20:16

## 2018-06-24 ASSESSMENT — PAIN SCALES - GENERAL
PAINLEVEL_OUTOF10: 0
PAINLEVEL_OUTOF10: 0

## 2018-06-24 NOTE — CARE PLAN
Problem: Communication  Goal: The ability to communicate needs accurately and effectively will improve    Intervention: Educate patient and significant other/support system about the plan of care, procedures, treatments, medications and allow for questions  Discussed with patient the plan of care for the evening, he verbalized understanding and wanted to know when he would be getting his shower. Informed him that the CNA will be in shortly to aid him with that, after his shower he felt much better. Hourly rounding in place.      Problem: Pain Management  Goal: Pain level will decrease to patient's comfort goal  Outcome: MET Date Met: 06/24/18  Patient denies any pain or discomfort at this time, encouraged him to use his call light should that change. He verbalized understanding. Hourly rounding in place.

## 2018-06-24 NOTE — CARE PLAN
Problem: Communication  Goal: The ability to communicate needs accurately and effectively will improve    Intervention: [place] patient and significant other/support system to call light to alert staff of needs  Patient with expressive aphasia, monitored for needs as needed.      Problem: Safety  Goal: Will remain free from injury  Patient is cleared for Mod I with no assistive device, & exhibits good safety awareness, will continue to monitor.

## 2018-06-24 NOTE — CONSULTS
DATE OF SERVICE:  06/22/2018    BRIEF HISTORY OF PRESENTING COMPLAINTS:  The patient is a 68-year-old white    male who is referred for a behavioral medicine evaluation by Dr. Cedillo.  The patient was transferred to rehab from acute where he was   treated for a stroke.  An MRI on 06/17 showed occlusion of the distal left M1   with multifocal acute infarction in the left frontal, parietal, temporal and   occipital lobes.  The patient was outside the window floor from back to   [facility].  The patient was treated and stabilized.  He was then sent to rehab to   address his general debility.    PAST MEDICAL HISTORY:  Significant for tobacco dependence    PSYCHOLOGICAL STATUS:  MENTAL STATUS EXAMINATION:  The patient is a well-nourished overweight male of   tall stature who appeared his stated age of 68.  At presentation, the patient   was alert.  He was sitting upright in his bed when approached.  The patient   oriented well to my presence.  The patient was kempt in appearance.  He was   dressed casually in street attire that was appropriate to his age and setting.    The patient's manner of presentation was cooperative.    Patient was oriented to time, place, and person, but not precisely to the   date.  His language and speech were characterized by hesitancy and problems   with word finding ability.  He also was somewhat dysarthric at times.  There   were long pauses after he was asked questions.  He only responded to   questions.  The patient's concentration and memory function appeared slightly   diminished.    Patient's affect was constricted, stable and mildly intense.  He related only   marginally well.  His mood appeared anxious and depressed, but appropriate to   the context.    There was no evidence for delusional or perceptual disturbance.  Also, the   patient showed no unusual pain or motor behavior during the interview.    SPECIFIC BEHAVIORAL COMPLAINTS:  Patient admitted to symptoms of generalized    mood disturbance.  He reported in the past several days he has felt restless,   tense and keyed up guilty, disappointed of himself and worried.  He said he   feels sad and sometimes worthless.  He reported feeling nervous and he   admitted to a loss of confidence in himself and difficulty motivating himself   to do things.  The patient denied any strong feelings of hopelessness or any   suicidal ideation.    The patient reported no significant pain.  He said his appetite is little   diminished and his energy level is low.  He said he is sleeping, but   excessively.    The patient reported no interpersonal discord or discomfort.  He said the   relationship with his girlfriend is going fairly well.  Patient said he was   taking care of his day-to-day responsibilities at the time of his   hospitalization.    Patient reported no ETOH or other drug abuse.  He did admit to long history of   tobacco dependence.    PSYCHIATRIC HISTORY:  The patient denied any history significant for   psychiatric disturbance or treatment including in or outpatient care.    PSYCHOMETRIC TESTING:  Patient was administered 2 psychometric tests and 2   screening instruments.  The PS/PC-R revealed symptoms of generalized mood   disturbance.  Both the patient's anxiety and depression scale scores were   elevated.  The patient's CDR survey showed no problems with level of   consciousness.  He showed some diminishment of his attention and his thinking   seemed impaired for reasoning, insight in scope.  The patient was oriented,   but not precisely to the date.  The patient's speech was noticeable for   difficulty with word finding ability, incomplete sentences and word   pronunciation.  Moreover, the patient reported diminishment of his energy   level, and intermittent sleep disturbance and sometimes excessive sleep.  He   also reported slight diminishment of his memory.  He admitted to symptoms of   generalized mood disturbance, and diminished  appetite.  He reported no pain or   problems with alcohol.  He is a tobacco smoker.    Patient was screened for any elder abuse or risk of suicide.  There is no   strong evidence for either problem.    SOCIAL HISTORY:  The patient is retired.  He lives with his girlfriend in   [place], [place].    IMPRESSION:  Adjustment disorder with mixed emotional features.    RECOMMENDATIONS:  Patient will be followed for status and supportive care.    Considerable time was spent with the patient, talking about the possible   effects from a stroke including depression and anxiety before adjusts to the   deficits they confront with the CVA.  Aspects of the [facility] book   were reviewed with him.       ____________________________________     SRIRAM HENDRICKS, PHD    NERY / MALIK    DD:  06/24/2018 09:18:41  DT:  06/24/2018 10:00:36    D#:  [id number]  Job#:  [id number]

## 2018-06-25 PROCEDURE — 700111 HCHG RX REV CODE 636 W/ 250 OVERRIDE (IP): Performed by: PHYSICAL MEDICINE & REHABILITATION

## 2018-06-25 PROCEDURE — A9270 NON-COVERED ITEM OR SERVICE: HCPCS | Performed by: PHYSICAL MEDICINE & REHABILITATION

## 2018-06-25 PROCEDURE — 770010 HCHG ROOM/CARE - REHAB SEMI PRIVAT*

## 2018-06-25 PROCEDURE — 97530 THERAPEUTIC ACTIVITIES: CPT

## 2018-06-25 PROCEDURE — 97116 GAIT TRAINING THERAPY: CPT

## 2018-06-25 PROCEDURE — 700112 HCHG RX REV CODE 229: Performed by: PHYSICAL MEDICINE & REHABILITATION

## 2018-06-25 PROCEDURE — 97110 THERAPEUTIC EXERCISES: CPT

## 2018-06-25 PROCEDURE — 700102 HCHG RX REV CODE 250 W/ 637 OVERRIDE(OP): Performed by: PHYSICAL MEDICINE & REHABILITATION

## 2018-06-25 PROCEDURE — 92507 TX SP LANG VOICE COMM INDIV: CPT

## 2018-06-25 PROCEDURE — 99233 SBSQ HOSP IP/OBS HIGH 50: CPT | Performed by: PHYSICAL MEDICINE & REHABILITATION

## 2018-06-25 RX ADMIN — DOCUSATE SODIUM 100 MG: 100 CAPSULE, LIQUID FILLED ORAL at 08:07

## 2018-06-25 RX ADMIN — VITAMIN D, TAB 1000IU (100/BT) 2000 UNITS: 25 TAB at 08:07

## 2018-06-25 RX ADMIN — ATORVASTATIN CALCIUM 80 MG: 40 TABLET, FILM COATED ORAL at 19:55

## 2018-06-25 RX ADMIN — STANDARDIZED SENNA CONCENTRATE AND DOCUSATE SODIUM 1 TABLET: 8.6; 5 TABLET, FILM COATED ORAL at 05:46

## 2018-06-25 RX ADMIN — ASPIRIN 325 MG ORAL TABLET 325 MG: 325 PILL ORAL at 08:07

## 2018-06-25 RX ADMIN — ENOXAPARIN SODIUM 40 MG: 100 INJECTION SUBCUTANEOUS at 08:08

## 2018-06-25 RX ADMIN — LISINOPRIL 2.5 MG: 5 TABLET ORAL at 05:46

## 2018-06-25 ASSESSMENT — GAIT ASSESSMENTS
DISTANCE (FEET): 425
GAIT LEVEL OF ASSIST: STAND BY ASSIST

## 2018-06-25 ASSESSMENT — PAIN SCALES - GENERAL: PAINLEVEL_OUTOF10: 0

## 2018-06-25 NOTE — CARE PLAN
Problem: Bathing  Goal: STG-Within one week, patient will bathe  1) Individualized Goal:  Body with mod I using AE/AD/techniques as needed.  2) Interventions:  OT Group Therapy, OT Self Care/ADL, OT Cognitive Skill Dev, OT Community Reintegration, OT Manual Ther Technique, OT Neuro Re-Ed/Balance, OT Therapeutic Activity, OT Evaluation and OT Therapeutic Exercise     Outcome: NOT MET  Supervised    Problem: Dressing  Goal: STG-Within one week, patient will dress LB  1) Individualized Goal:  with mod I using AE/AD/techniques as needed.  2) Interventions:  OT Group Therapy, OT Self Care/ADL, OT Cognitive Skill Dev, OT Community Reintegration, OT Manual Ther Technique, OT Neuro Re-Ed/Balance, OT Therapeutic Activity, OT Evaluation and OT Therapeutic Exercise   Outcome: NOT MET  Supervised    Problem: Toileting  Goal: STG-Within one week, patient will complete toileting tasks  1) Individualized Goal:  with mod I using AE/AD/techniques as needed.  2) Interventions:  OT Group Therapy, OT Self Care/ADL, OT Cognitive Skill Dev, OT Community Reintegration, OT Manual Ther Technique, OT Neuro Re-Ed/Balance, OT Therapeutic Activity, OT Evaluation and OT Therapeutic Exercise   Outcome: NOT MET  Supervised

## 2018-06-25 NOTE — REHAB-SLP IDT TEAM NOTE
Speech Therapy   Cognitive Linquistic Functions  Comprehension Initial:  5 - Stand-by Prompting/Supervision or Set-up  Comprehension Current:  4 - Minimal Assistance   Comprehension Description:  Verbal cues  Expression Initial:  5 - Stand-by Prompting/Supervision or Set-up  Expression Current:  4 - Minimal Assistance   Expression Description:  Verbal cueing  Social Interaction Initial:  6 - Modified Independent  Social Interaction Current:  6 - Modified Independent   Social Interaction Description:  Increased time  Problem Solving Initial:  5 - Standby Prompting/Supervision or Set-up  Problem Solving Current:  4 - Minimal Assistance   Problem Solving Description:  Verbal cueing, Therapy schedule, Increased time  Memory Initial:  5 - Standby Prompting/Supervision or Set-up  Memory Current:  4 - Minimal Assistance   Memory Description:  Supervision, Therapy schedule  Executive Functioning / Organization Initial:     Executive Functioning / Organization Current:      Executive Functioning Desciption:  TBA  Swallowing  Swallowing Status:  Patient does not currently receive or require dysphagia intervention.  Orders Placed This Encounter   Procedures   • Diet Order     Standing Status:   Standing     Number of Occurrences:   1     Order Specific Question:   Diet:     Answer:   Regular [1]     Behavior Modification Plan  Keep instructions simple/concrete, Give clear feedback and Set clear goals  Assistive Technology  Low tech: Calendar, planner, schedule, alarms/timers, pill organizer, post-it notes, lists  Family Training/Education:  To be scheduled.  DC Recommendations:   Anticipate patient will need additional ST services upon discharge from this facility.  Strengths:  Able to follow instructions, Alert and oriented, Making steady progress towards goals, Motivated for self care and independence, Pleasant and cooperative, Supportive family and Willingly participates in therapeutic activities  Barriers:  Other:  perseverations, impaired visual processing.  # of short term goals set=   3  # of short term goals met=  0       Speech Therapy Problems           Problem: Comprehension STGs     Dates: Start: 06/21/18       Goal: STG-Within one week, patient will     Dates: Start: 06/21/18       Description: 1) Individualized goal:  Patient will match words to pictures in field of 6 with 60% acc.  2) Interventions:  SLP Speech Language Treatment and SLP Group Treatment       Note:     Goal Note filed on 06/25/18 1213 by Jazmin Dumont MS,CCC-SLP    Goal: STG-Within one week, patient will  Outcome: NOT MET  Currently 50% acc, with max cues to improve.                  Problem: Expression STGs     Dates: Start: 06/21/18       Goal: STG-Within one week, patient will     Dates: Start: 06/21/18       Description: 1) Individualized goal:  Will generate 6 words for given simple category with in one minute.   2) Interventions:  SLP Speech Language Treatment and SLP Group Treatment       Note:     Goal Note filed on 06/25/18 1213 by Jazmin Dumont MS,CCC-SLP    Goal: STG-Within one week, patient will  Outcome: NOT MET  Not yet addressed.                Goal: STG-Within one week, patient will     Dates: Start: 06/21/18       Description: 1) Individualized goal:  Describe routine activity sequences and action pictures with 90% acc.  2) Interventions:  SLP Speech Language Treatment and SLP Group Treatment       Note:     Goal Note filed on 06/25/18 1213 by Jazmin Dumont MS,CCC-SLP    Goal: STG-Within one week, patient will  Outcome: NOT MET  Not yet addressed.                Goal: STG-Within one week, patient will express     Dates: Start: 06/21/18               Problem: Speech/Swallowing LTGs     Dates: Start: 06/21/18       Goal: LTG-By discharge, patient will express     Dates: Start: 06/21/18       Description: 1) Individualized goal:  Basic needs and moderately complex sequence descriptions with 95% acc with use of word finding  strategies with mod I.  2) Interventions:  SLP Speech Language Treatment and SLP Group Treatment             Goal: LTG-By discharge, patient will     Dates: Start: 06/21/18       Description: 1) Individualized goal:  Read at sentence and simple paragraph level with 80% acc.  2) Interventions:  SLP Speech Language Treatment and SLP Group Treatment                    Section completed by:  Jazmin Dumont MS,CCC-SLP

## 2018-06-25 NOTE — REHAB-COLLABORATIVE ONGOING IDT TEAM NOTE
Weekly Interdisciplinary Team Conference Note    Weekly Interdisciplinary Team Conference # 1  Date:  6/25/2018    Clinicians present and reporting at team conference include the following:   MD: Xavi Cedillo MD   RN:  Sophia Michelle, ANDRA   PT:   Alondra Brandon, PT, DPT  OT:  Erickson Arita, MS, OTR/L   ST:  Jazmin Dumont, MS, CCC-SLP  CM:  Migdalia Elam, RN, CCM  REC:  None  RT:  Lito Sandy, RRT  RPh:  Gualberto Reese [facility]  Other:   None  All reporting clinicians have a working knowledge of this patient's plan of care.    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 **           Nursing  Diet/Nutrition:  Regular and Thin Liquids  Medication Administration:  Whole with Liquid Wash  % consumed at meals in last 24 hours:    Meal/Snack Consumption for the past 24 hrs:   Oral Nutrition   06/24/18 1254 Lunch;Between % Consumed   06/24/18 0843 Breakfast;Between % Consumed       Snack schedule:  None  Appetite:  Excellent  Fluid Intake/Output in past 24 hours: In: 1520 [P.O.:1520]  Out: -   Admit Weight:  Weight: 95 kg (209 lb 8 oz)  Weight Last 7 Days   [95 kg (209 lb 8 oz)-95.9 kg (211 lb 6.7 oz)] 95.9 kg (211 lb 6.7 oz) (06/24 0600)    Pain Issues:    Location:  --  --         Severity:  Denies   Scheduled pain medications:  None     PRN pain medications used in last 24 hours:  None   Non Pharmacologic Interventions:  emotional support, relaxation, repositioned and rest  Effectiveness of pain management plan:  good=patient states acceptable comfort after interventions    Bowel:    Bowel Assist Initial Score:  5 - Standby Prompting/Supervision or Set-up  Bowel Assist  Current Score:  6 - Modified Independent  Bowl Accidents in last 7 days:  0  Last bowel movement: 06/22/18  Stool Description: Large (per pt)     Usual bowel pattern:  every 2-3 days  Scheduled bowel medications:  docusate sodium (COLACE)  PRN bowel medications used in last 24 hours:  Senna  Nursing Interventions:  Increased time, Scheduled medication, PRN medication  Effectiveness of bowel program:   fair=sometimes needs prn bowel meds for constipation  Bladder:    Bladder Assist Initial Score:  5 - Standby Prompting/Supervision or Set-up  Bladder Assist Current Score:  6 - Modified Independent  Bladder Accidents in last 7 days:  0  PVR range for past 24-48 hours: -- ()  Medications affecting bladder:  None    Time void schedule/voiding pattern:  Voiding every 2-4 hours  Interventions:  Increased time  Effectiveness of bladder training:  Good=regular, predictable, emptying of bladder, patient initiates time voiding    Sleep/wake cycle:   Average hours slept:  Sleeps greater than 6 hours without waking  Sleep medication usage:  None    Patient/Family Training/Education:  Diet/Nutrition, Fall Prevention, General Self Care, Medication Management and Safety  Discharge Supply Recommendations:  Blood Pressure Monitor  Strengths: Alert and oriented, Willingly participates in therapeutic activities, Pleasant and cooperative and Other:  Supportive girlfriend   Barriers:   Aphasia expressive, Generalized weakness and Other:  Dysarthria            Nursing Problems           Problem: Bowel/Gastric:     Goal: Normal bowel function is maintained or improved           Goal: Will not experience complications related to bowel motility             Problem: Communication     Goal: The ability to communicate needs accurately and effectively will improve             Problem: Discharge Barriers/Planning     Goal: Patient's continuum of care needs will be met             Problem: Infection     Goal: Will remain free from infection              Problem: Knowledge Deficit     Goal: Knowledge of disease process/condition, treatment plan, diagnostic tests, and medications will improve           Goal: Knowledge of the prescribed therapeutic regimen will improve             Problem: Respiratory:     Goal: Respiratory status will improve             Problem: Safety     Goal: Will remain free from injury           Goal: Will remain free from falls             Problem: Venous Thromboembolism (VTW)/Deep Vein Thrombosis (DVT) Prevention:     Goal: Patient will participate in Venous Thrombosis (VTE)/Deep Vein Thrombosis (DVT)Prevention Measures                  Long Term Goals:   At discharge patient will be able to function safely at home and in the community with support.    Section completed by:  Marsha Patel R.N.              Mobility  Bed mobility:   5  Bed /Chair/Wheelchair Transfer Initial:  5 - Standby Prompting/Supervision or Set-up  Bed /Chair/Wheelchair Transfer Current:  5 - Standby Prompting/Supervision or Set-up   Bed/Chair/Wheelchair Transfer Description:   (SPV)  Walk Initial:  6 - Modified Independent  Walk Current:  5 - Standby Prompting/Supervision or Set-up   Walk Description:   (SPV indoors, much less R neglect issues observed this p.m. indoors than with community mobility; trialed standard sneaker on L foot to decrease heel height difference with R walking surgical shoe; 2x 350 feet anterior; path finding cues)  Wheelchair Initial:  0 - Not tested, patient refused  Wheelchair Current:  0 - Not tested, patient refused   Wheelchair Description:     Stairs Initial:  4 - Minimal Assistance  Stairs Current: 5 - Standby Prompting/Supervision or Set-up   Stairs Description:  (close SBA, 0-1 railings, R surgical shoe and L sneaker, reciprocal pattern, min cueing for safety, increased time, 12 standard stairs)  Patient/Family Training/Education:  High level balance  DME/DC Recommendations:  No continued therapy or equipment recommended    Strengths:  Good endurance, Good insight into deficits/needs, Making steady progress towards goals, Motivated for self care and independence and Pleasant and cooperative  Barriers:   Other: mild R neglect, motor planning, safety awareness  # of short term goals set= 3  # of short term goals met=2       Physical Therapy Problems           Problem: Balance     Dates: Start: 06/21/18       Goal: STG-Within one week, patient will     Dates: Start: 06/21/18       Description: 1) Individualized goal:  Pt with qualify as low fall risk on standardized balance assessment.   2) Interventions:  PT Group Therapy, PT Gait Training, PT Therapeutic Exercises, PT TENS Application, PT Neuro Re-Ed/Balance, PT Aquatic Therapy, PT Therapeutic Activity, PT Manual Therapy and PT Evaluation               Problem: Mobility     Dates: Start: 06/21/18       Goal: STG-Within one week, patient will ambulate community distances     Dates: Start: 06/21/18       Description: 1) Individualized goal:  Pt will AMB 1,500ft with head turns and on uneven surface in community, SPV  2) Interventions:  PT Group Therapy, PT Gait Training, PT Therapeutic Exercises, PT TENS Application, PT Neuro Re-Ed/Balance, PT Aquatic Therapy, PT Therapeutic Activity, PT Manual Therapy and PT Evaluation             Goal: STG-Within one week, patient will ambulate up/down flight of stairs     Dates: Start: 06/21/18       Description: 1) Individualized goal:  Pt will AMB up/down a flight of stairs with SPV  2) Interventions:  PT Group Therapy, PT Gait Training, PT Therapeutic Exercises, PT TENS Application, PT Neuro Re-Ed/Balance, PT Aquatic Therapy, PT Therapeutic Activity, PT Manual Therapy and PT Evaluation               Problem: PT-Long Term Goals     Dates: Start: 06/21/18       Goal: LTG-By discharge, patient will ambulate     Dates: Start: 06/21/18       Description: 1) Individualized goal:  Pt will AMB >2000ft x 1 with no AD on even and uneven terrain,  independently  2) Interventions:  PT Group Therapy, PT Gait Training, PT Therapeutic Exercises, PT TENS Application, PT Neuro Re-Ed/Balance, PT Aquatic Therapy, PT Therapeutic Activity, PT Manual Therapy and PT Evaluation             Goal: LTG-By discharge, patient will transfer one surface to another     Dates: Start: 06/21/18       Description: 1) Individualized goal: Pt will perform all transfers mod I  2) Interventions:  PT Group Therapy, PT Gait Training, PT Therapeutic Exercises, PT TENS Application, PT Neuro Re-Ed/Balance, PT Aquatic Therapy, PT Therapeutic Activity, PT Manual Therapy and PT Evaluation             Goal: LTG-By discharge, patient will perform home exercise program     Dates: Start: 06/21/18       Description: 1) Individualized goal:  Pt will perform HEP for balance, mod I  2) Interventions:  PT Group Therapy, PT Gait Training, PT Therapeutic Exercises, PT TENS Application, PT Neuro Re-Ed/Balance, PT Aquatic Therapy, PT Therapeutic Activity, PT Manual Therapy and PT Evaluation                     Section completed by:  Alondra Brandon, PT       Activities of Daily Living  Eating Initial:  5 - Standby Prompting/Supervision or Set-up  Eating Current:  6 - Modified Independent   Eating Description:   (extra time)  Grooming Initial:  5 - Standby Prompting/Supervision or Set-up  Grooming Current:  5 - Standby Prompting/Supervision or Set-up   Grooming Description:  Supervision for safety  Bathing Initial:  5 - Standby Prompting/Supervision or Set-up  Bathing Current:  5 - Standby Prompting/Supervision or Set-up   Bathing Description:  Supervision for safety, Set-up of equipment  Upper Body Dressing Initial:  5 - Standby Prompting/Supervision or Set-up  Upper Body Dressing Current:  5 - Standby Prompting/Supervision or Set-up   Upper Body Dressing Description:  Set-up of equipment, Supervision for safety  Lower Body Dressing Initial:  5 - Standby Prompting/Supervsion or Set-up  Lower Body Dressing  Current:  5 - Standby Prompting/Supervsion or Set-up   Lower Body Dressing Description:  5 - Standby Prompting/Supervsion or Set-up  Toileting Initial:  5 - Standby Prompting/Supervision or Set-up  Toileting Current:  5 - Standby Prompting/Supervision or Set-up   Toileting Description:  Supervision for safety  Toilet Transfer Initial:  5 - Standby Prompting/Supervision or Set-up  Toilet Transfer Current:  5 - Standby Prompting/Supervision or Set-up   Toilet Transfer Description:  5 - Standby Prompting/Supervision or Set-up  Tub / Shower Transfer Initial:  5 - Standby Prompting/Supervision or Set-up  Tub / Shower Transfer Current:  5 - Standby Prompting/Supervision or Set-up   Tub / Shower Transfer Description:  Supervision for safety  IADL:  Not yet addressed  Family Training/Education: None  DME/DC Recommendations: Should not need any DME at d/c; Recommend outpatient OT    Strengths:  Good endurance, Good insight into deficits/needs, Independent PLOF, Manages pain appropriately, Motivated for self care and independence, Pleasant and cooperative, Supportive family and Willingly participates in therapeutic activities  Barriers:  Aphasia expressive, Generalized weakness and Other: R UE weakness, delayed coordination R UE, R UE dysmetria, mildly impaired R hand FMC     # of short term goals set= 3    # of short term goals met= 0          Occupational Therapy Goals           Problem: Bathing     Dates: Start: 06/21/18       Goal: STG-Within one week, patient will bathe     Dates: Start: 06/21/18       Description: 1) Individualized Goal:  Body with mod I using AE/AD/techniques as needed.  2) Interventions:  OT Group Therapy, OT Self Care/ADL, OT Cognitive Skill Dev, OT Community Reintegration, OT Manual Ther Technique, OT Neuro Re-Ed/Balance, OT Therapeutic Activity, OT Evaluation and OT Therapeutic Exercise       Note:     Goal Note filed on 06/25/18 1152 by Erickson Arita MS,OTR/L    Goal: STG-Within one week,  patient will bathe  Outcome: NOT MET  Supervised                  Problem: Dressing     Dates: Start: 06/21/18       Goal: STG-Within one week, patient will dress LB     Dates: Start: 06/21/18       Description: 1) Individualized Goal:  with mod I using AE/AD/techniques as needed.  2) Interventions:  OT Group Therapy, OT Self Care/ADL, OT Cognitive Skill Dev, OT Community Reintegration, OT Manual Ther Technique, OT Neuro Re-Ed/Balance, OT Therapeutic Activity, OT Evaluation and OT Therapeutic Exercise     Note:     Goal Note filed on 06/25/18 1152 by Erickson Arita MS,OTR/L    Goal: STG-Within one week, patient will dress LB  Outcome: NOT MET  Supervised                  Problem: OT Long Term Goals     Dates: Start: 06/21/18       Goal: LTG-By discharge, patient will complete basic self care tasks     Dates: Start: 06/21/18       Description:   1) Individualized Goal:  Body independently.  2) Interventions:  OT Group Therapy, OT Self Care/ADL, OT Cognitive Skill Dev, OT Community Reintegration, OT Manual Ther Technique, OT Neuro Re-Ed/Balance, OT Therapeutic Activity, OT Evaluation and OT Therapeutic Exercise           Goal: LTG-By discharge, patient will perform bathroom transfers     Dates: Start: 06/21/18       Description: 1) Individualized Goal: independently.  2) Interventions:  OT Group Therapy, OT Self Care/ADL, OT Cognitive Skill Dev, OT Community Reintegration, OT Manual Ther Technique, OT Neuro Re-Ed/Balance, OT Therapeutic Activity, OT Evaluation and OT Therapeutic Exercise           Goal: LTG-By discharge, patient will complete basic home management     Dates: Start: 06/21/18       Description: 1) Individualized Goal:  independently.  2) Interventions:  OT Group Therapy, OT Self Care/ADL, OT Cognitive Skill Dev, OT Community Reintegration, OT Manual Ther Technique, OT Neuro Re-Ed/Balance, OT Therapeutic Activity, OT Evaluation and OT Therapeutic Exercise             Problem: Toileting     Dates:  Start: 06/21/18       Goal: STG-Within one week, patient will complete toileting tasks     Dates: Start: 06/21/18       Description: 1) Individualized Goal:  with mod I using AE/AD/techniques as needed.  2) Interventions:  OT Group Therapy, OT Self Care/ADL, OT Cognitive Skill Dev, OT Community Reintegration, OT Manual Ther Technique, OT Neuro Re-Ed/Balance, OT Therapeutic Activity, OT Evaluation and OT Therapeutic Exercise     Note:     Goal Note filed on 06/25/18 1152 by Erickson Arita MS,OTR/L    Goal: STG-Within one week, patient will complete toileting tasks  Outcome: NOT MET  Supervised                      Section completed by:  Erickson Arita MS,OTR/L       Cognitive Linquistic Functions  Comprehension Initial:  5 - Stand-by Prompting/Supervision or Set-up  Comprehension Current:  4 - Minimal Assistance   Comprehension Description:  Verbal cues  Expression Initial:  5 - Stand-by Prompting/Supervision or Set-up  Expression Current:  4 - Minimal Assistance   Expression Description:  Verbal cueing  Social Interaction Initial:  6 - Modified Independent  Social Interaction Current:  6 - Modified Independent   Social Interaction Description:  Increased time  Problem Solving Initial:  5 - Standby Prompting/Supervision or Set-up  Problem Solving Current:  4 - Minimal Assistance   Problem Solving Description:  Verbal cueing, Therapy schedule, Increased time  Memory Initial:  5 - Standby Prompting/Supervision or Set-up  Memory Current:  4 - Minimal Assistance   Memory Description:  Supervision, Therapy schedule  Executive Functioning / Organization Initial:     Executive Functioning / Organization Current:      Executive Functioning Desciption:  TBA  Swallowing  Swallowing Status:  Patient does not currently receive or require dysphagia intervention.  Orders Placed This Encounter   Procedures   • Diet Order     Standing Status:   Standing     Number of Occurrences:   1     Order Specific Question:   Diet:      Answer:   Regular [1]     Behavior Modification Plan  Keep instructions simple/concrete, Give clear feedback and Set clear goals  Assistive Technology  Low tech: Calendar, planner, schedule, alarms/timers, pill organizer, post-it notes, lists  Family Training/Education:  To be scheduled.  DC Recommendations:   Anticipate patient will need additional ST services upon discharge from this facility.  Strengths:  Able to follow instructions, Alert and oriented, Making steady progress towards goals, Motivated for self care and independence, Pleasant and cooperative, Supportive family and Willingly participates in therapeutic activities  Barriers:  Other: perseverations, impaired visual processing.  # of short term goals set=   3  # of short term goals met=  0       Speech Therapy Problems           Problem: Comprehension STGs     Dates: Start: 06/21/18       Goal: STG-Within one week, patient will     Dates: Start: 06/21/18       Description: 1) Individualized goal:  Patient will match words to pictures in field of 6 with 60% acc.  2) Interventions:  SLP Speech Language Treatment and SLP Group Treatment       Note:     Goal Note filed on 06/25/18 1213 by Jazmin Dumont MS,CCC-SLP    Goal: STG-Within one week, patient will  Outcome: NOT MET  Currently 50% acc, with max cues to improve.                  Problem: Expression STGs     Dates: Start: 06/21/18       Goal: STG-Within one week, patient will     Dates: Start: 06/21/18       Description: 1) Individualized goal:  Will generate 6 words for given simple category with in one minute.   2) Interventions:  SLP Speech Language Treatment and SLP Group Treatment       Note:     Goal Note filed on 06/25/18 1213 by Jazmin Dumont MS,CCC-SLP    Goal: STG-Within one week, patient will  Outcome: NOT MET  Not yet addressed.                Goal: STG-Within one week, patient will     Dates: Start: 06/21/18       Description: 1) Individualized goal:  Describe routine activity  sequences and action pictures with 90% acc.  2) Interventions:  SLP Speech Language Treatment and SLP Group Treatment       Note:     Goal Note filed on 06/25/18 1213 by Jazmin Dumont MS,CCC-SLP    Goal: STG-Within one week, patient will  Outcome: NOT MET  Not yet addressed.                Goal: STG-Within one week, patient will express     Dates: Start: 06/21/18               Problem: Speech/Swallowing LTGs     Dates: Start: 06/21/18       Goal: LTG-By discharge, patient will express     Dates: Start: 06/21/18       Description: 1) Individualized goal:  Basic needs and moderately complex sequence descriptions with 95% acc with use of word finding strategies with mod I.  2) Interventions:  SLP Speech Language Treatment and SLP Group Treatment             Goal: LTG-By discharge, patient will     Dates: Start: 06/21/18       Description: 1) Individualized goal:  Read at sentence and simple paragraph level with 80% acc.  2) Interventions:  SLP Speech Language Treatment and SLP Group Treatment                    Section completed by:  Jazmin Dumont MS,CCC-SLP          REHAB-Pharmacy IDT Team Note by Gualberto Reese RPH at 6/22/2018  4:54 PM  Version 1 of 1    Author:  Gualberto Reese RPH Service:  (none) Author Type:  Pharmacist    Filed:  6/22/2018  4:55 PM Date of Service:  6/22/2018  4:54 PM Status:  Signed    :  Gualberto Reese RPH (Pharmacist)         Pharmacy   Pharmacy  Antibiotics/Antifungals/Antivirals:  Medication:      Active Orders     None        Route:        NA  Stop Date:  NA  Reason:      NA  Antihypertensives/Cardiac:  Medication:    Orders (72h ago through future)    Start     Ordered    06/23/18 0600  lisinopril (PRINIVIL) tablet 2.5 mg  Q DAY      06/22/18 1148    06/22/18 1215  lisinopril (PRINIVIL) tablet 2.5 mg  Q DAY,   Status:  Discontinued      06/22/18 1148    06/21/18 2100  atorvastatin (LIPITOR) tablet 80 mg  EVERY EVENING      06/21/18 0402    06/20/18 1526  hydrALAZINE  (APRESOLINE) tablet 25 mg  EVERY 8 HOURS PRN      06/20/18 1526        Patient Vitals for the past 24 hrs:   BP Pulse   06/22/18 1400 130/84 72   06/22/18 0700 148/80 75   06/21/18 1940 144/84 76   06/21/18 1843 (!) 162/82 75       Anticoagulation:  Medication: Aspirin, Lovenox.    Other key medications: A review of the medication list reveals no issues at this time. Patient is currently on antihypertensive(s). Recommend home blood pressure monitoring/recording if antihypertensive(s) regimen(s) continue.    Section completed by: Gualberto Reese [facility][AW.1]     Attribution Key     AW.1 - Gualberto Reese [facility] ED on 6/22/2018  4:54 PM                    DC Planning  DC destination/disposition:  Patient lives with his significant other in a single level home in [place].  He has 3 entry steps    Referrals: Orthopaedics for foot fracture    DC Needs: Patient is ambulating without a device.  He will need follow up outpatient therapy.  He will need follow up with cardiology and neurology/stroke bridge.      Barriers to discharge:       Strengths: good support and motivation.    Section completed by:  Migdalia Elam R.N.      Physician Summary  Xaiv Cedillo MD participated and led team conference discussion.

## 2018-06-25 NOTE — REHAB-NURSING IDT TEAM NOTE
Nursing   Nursing  Diet/Nutrition:  Regular and Thin Liquids  Medication Administration:  Whole with Liquid Wash  % consumed at meals in last 24 hours:    Meal/Snack Consumption for the past 24 hrs:   Oral Nutrition   06/24/18 1254 Lunch;Between % Consumed   06/24/18 0843 Breakfast;Between % Consumed       Snack schedule:  None  Appetite:  Excellent  Fluid Intake/Output in past 24 hours: In: 1520 [P.O.:1520]  Out: -   Admit Weight:  Weight: 95 kg (209 lb 8 oz)  Weight Last 7 Days   [95 kg (209 lb 8 oz)-95.9 kg (211 lb 6.7 oz)] 95.9 kg (211 lb 6.7 oz) (06/24 0600)    Pain Issues:    Location:  --  --         Severity:  Denies   Scheduled pain medications:  None     PRN pain medications used in last 24 hours:  None   Non Pharmacologic Interventions:  emotional support, relaxation, repositioned and rest  Effectiveness of pain management plan:  good=patient states acceptable comfort after interventions    Bowel:    Bowel Assist Initial Score:  5 - Standby Prompting/Supervision or Set-up  Bowel Assist Current Score:  6 - Modified Independent  Bowl Accidents in last 7 days:  0  Last bowel movement: 06/22/18  Stool Description: Large (per pt)     Usual bowel pattern:  every 2-3 days  Scheduled bowel medications:  docusate sodium (COLACE)  PRN bowel medications used in last 24 hours:  Senna  Nursing Interventions:  Increased time, Scheduled medication, PRN medication  Effectiveness of bowel program:   fair=sometimes needs prn bowel meds for constipation  Bladder:    Bladder Assist Initial Score:  5 - Standby Prompting/Supervision or Set-up  Bladder Assist Current Score:  6 - Modified Independent  Bladder Accidents in last 7 days:  0  PVR range for past 24-48 hours: -- ()  Medications affecting bladder:  None    Time void schedule/voiding pattern:  Voiding every 2-4 hours  Interventions:  Increased time  Effectiveness of bladder training:  Good=regular, predictable, emptying of bladder, patient initiates time  voiding    Sleep/wake cycle:   Average hours slept:  Sleeps greater than 6 hours without waking  Sleep medication usage:  None    Patient/Family Training/Education:  Diet/Nutrition, Fall Prevention, General Self Care, Medication Management and Safety  Discharge Supply Recommendations:  Blood Pressure Monitor  Strengths: Alert and oriented, Willingly participates in therapeutic activities, Pleasant and cooperative and Other:  Supportive girlfriend   Barriers:   Aphasia expressive, Generalized weakness and Other:  Dysarthria            Nursing Problems           Problem: Bowel/Gastric:     Goal: Normal bowel function is maintained or improved           Goal: Will not experience complications related to bowel motility             Problem: Communication     Goal: The ability to communicate needs accurately and effectively will improve             Problem: Discharge Barriers/Planning     Goal: Patient's continuum of care needs will be met             Problem: Infection     Goal: Will remain free from infection             Problem: Knowledge Deficit     Goal: Knowledge of disease process/condition, treatment plan, diagnostic tests, and medications will improve           Goal: Knowledge of the prescribed therapeutic regimen will improve             Problem: Respiratory:     Goal: Respiratory status will improve             Problem: Safety     Goal: Will remain free from injury           Goal: Will remain free from falls             Problem: Venous Thromboembolism (VTW)/Deep Vein Thrombosis (DVT) Prevention:     Goal: Patient will participate in Venous Thrombosis (VTE)/Deep Vein Thrombosis (DVT)Prevention Measures                  Long Term Goals:   At discharge patient will be able to function safely at home and in the community with support.    Section completed by:  Marsha Patel R.N.

## 2018-06-25 NOTE — CARE PLAN
Problem: Comprehension STGs  Goal: STG-Within one week, patient will  1) Individualized goal:  Patient will match words to pictures in field of 6 with 60% acc.  2) Interventions:  SLP Speech Language Treatment and SLP Group Treatment     Outcome: NOT MET  Currently 50% acc, with max cues to improve.    Problem: Expression STGs  Goal: STG-Within one week, patient will  1) Individualized goal:  Will generate 6 words for given simple category with in one minute.   2) Interventions:  SLP Speech Language Treatment and SLP Group Treatment     Outcome: NOT MET  Not yet addressed.  Goal: STG-Within one week, patient will  1) Individualized goal:  Describe routine activity sequences and action pictures with 90% acc.  2) Interventions:  SLP Speech Language Treatment and SLP Group Treatment     Outcome: NOT MET  Not yet addressed.

## 2018-06-25 NOTE — PROGRESS NOTES
Patient's GF Rosanne wanted to discuss how she could get in contact with Migdalia so that she can get a notary and POA. She also wants to talk to Dr. Cedillo in the morning in regards to the plan of care/treatment. Informed her I would call Migdalia in the morning, and that I would follow up with day shift to ensure she can talk with the MD. Patient wants me to leave her number should we need to contact her: [phone].

## 2018-06-25 NOTE — REHAB-PT IDT TEAM NOTE
Physical Therapy   Mobility  Bed mobility:   5  Bed /Chair/Wheelchair Transfer Initial:  5 - Standby Prompting/Supervision or Set-up  Bed /Chair/Wheelchair Transfer Current:  5 - Standby Prompting/Supervision or Set-up   Bed/Chair/Wheelchair Transfer Description:   (SPV)  Walk Initial:  6 - Modified Independent  Walk Current:  5 - Standby Prompting/Supervision or Set-up   Walk Description:   (SPV indoors, much less R neglect issues observed this p.m. indoors than with community mobility; trialed standard sneaker on L foot to decrease heel height difference with R walking surgical shoe; 2x 350 feet anterior; path finding cues)  Wheelchair Initial:  0 - Not tested, patient refused  Wheelchair Current:  0 - Not tested, patient refused   Wheelchair Description:     Stairs Initial:  4 - Minimal Assistance  Stairs Current: 5 - Standby Prompting/Supervision or Set-up   Stairs Description:  (close SBA, 0-1 railings, R surgical shoe and L sneaker, reciprocal pattern, min cueing for safety, increased time, 12 standard stairs)  Patient/Family Training/Education:  High level balance  DME/DC Recommendations:  No continued therapy or equipment recommended   Strengths:  Good endurance, Good insight into deficits/needs, Making steady progress towards goals, Motivated for self care and independence and Pleasant and cooperative  Barriers:   Other: mild R neglect, motor planning, safety awareness  # of short term goals set= 3  # of short term goals met=2       Physical Therapy Problems           Problem: Balance     Dates: Start: 06/21/18       Goal: STG-Within one week, patient will     Dates: Start: 06/21/18       Description: 1) Individualized goal:  Pt with qualify as low fall risk on standardized balance assessment.   2) Interventions:  PT Group Therapy, PT Gait Training, PT Therapeutic Exercises, PT TENS Application, PT Neuro Re-Ed/Balance, PT Aquatic Therapy, PT Therapeutic Activity, PT Manual Therapy and PT Evaluation                Problem: Mobility     Dates: Start: 06/21/18       Goal: STG-Within one week, patient will ambulate community distances     Dates: Start: 06/21/18       Description: 1) Individualized goal:  Pt will AMB 1,500ft with head turns and on uneven surface in community, SPV  2) Interventions:  PT Group Therapy, PT Gait Training, PT Therapeutic Exercises, PT TENS Application, PT Neuro Re-Ed/Balance, PT Aquatic Therapy, PT Therapeutic Activity, PT Manual Therapy and PT Evaluation             Goal: STG-Within one week, patient will ambulate up/down flight of stairs     Dates: Start: 06/21/18       Description: 1) Individualized goal:  Pt will AMB up/down a flight of stairs with SPV  2) Interventions:  PT Group Therapy, PT Gait Training, PT Therapeutic Exercises, PT TENS Application, PT Neuro Re-Ed/Balance, PT Aquatic Therapy, PT Therapeutic Activity, PT Manual Therapy and PT Evaluation               Problem: PT-Long Term Goals     Dates: Start: 06/21/18       Goal: LTG-By discharge, patient will ambulate     Dates: Start: 06/21/18       Description: 1) Individualized goal:  Pt will AMB >2000ft x 1 with no AD on even and uneven terrain, independently  2) Interventions:  PT Group Therapy, PT Gait Training, PT Therapeutic Exercises, PT TENS Application, PT Neuro Re-Ed/Balance, PT Aquatic Therapy, PT Therapeutic Activity, PT Manual Therapy and PT Evaluation             Goal: LTG-By discharge, patient will transfer one surface to another     Dates: Start: 06/21/18       Description: 1) Individualized goal: Pt will perform all transfers mod I  2) Interventions:  PT Group Therapy, PT Gait Training, PT Therapeutic Exercises, PT TENS Application, PT Neuro Re-Ed/Balance, PT Aquatic Therapy, PT Therapeutic Activity, PT Manual Therapy and PT Evaluation             Goal: LTG-By discharge, patient will perform home exercise program     Dates: Start: 06/21/18       Description: 1) Individualized goal:  Pt will perform HEP for balance,  mod I  2) Interventions:  PT Group Therapy, PT Gait Training, PT Therapeutic Exercises, PT TENS Application, PT Neuro Re-Ed/Balance, PT Aquatic Therapy, PT Therapeutic Activity, PT Manual Therapy and PT Evaluation                     Section completed by:  Alondra Brandon, PT

## 2018-06-25 NOTE — REHAB-OT IDT TEAM NOTE
Occupational Therapy   Activities of Daily Living  Eating Initial:  5 - Standby Prompting/Supervision or Set-up  Eating Current:  6 - Modified Independent   Eating Description:   (extra time)  Grooming Initial:  5 - Standby Prompting/Supervision or Set-up  Grooming Current:  5 - Standby Prompting/Supervision or Set-up   Grooming Description:  Supervision for safety  Bathing Initial:  5 - Standby Prompting/Supervision or Set-up  Bathing Current:  5 - Standby Prompting/Supervision or Set-up   Bathing Description:  Supervision for safety, Set-up of equipment  Upper Body Dressing Initial:  5 - Standby Prompting/Supervision or Set-up  Upper Body Dressing Current:  5 - Standby Prompting/Supervision or Set-up   Upper Body Dressing Description:  Set-up of equipment, Supervision for safety  Lower Body Dressing Initial:  5 - Standby Prompting/Supervsion or Set-up  Lower Body Dressing Current:  5 - Standby Prompting/Supervsion or Set-up   Lower Body Dressing Description:  5 - Standby Prompting/Supervsion or Set-up  Toileting Initial:  5 - Standby Prompting/Supervision or Set-up  Toileting Current:  5 - Standby Prompting/Supervision or Set-up   Toileting Description:  Supervision for safety  Toilet Transfer Initial:  5 - Standby Prompting/Supervision or Set-up  Toilet Transfer Current:  5 - Standby Prompting/Supervision or Set-up   Toilet Transfer Description:  5 - Standby Prompting/Supervision or Set-up  Tub / Shower Transfer Initial:  5 - Standby Prompting/Supervision or Set-up  Tub / Shower Transfer Current:  5 - Standby Prompting/Supervision or Set-up   Tub / Shower Transfer Description:  Supervision for safety  IADL:  Not yet addressed  Family Training/Education: None  DME/DC Recommendations: Should not need any DME at d/c; Recommend outpatient OT    Strengths:  Good endurance, Good insight into deficits/needs, Independent PLOF, Manages pain appropriately, Motivated for self care and independence, Pleasant and cooperative,  Supportive family and Willingly participates in therapeutic activities  Barriers:  Aphasia expressive, Generalized weakness and Other: R UE weakness, delayed coordination R UE, R UE dysmetria, mildly impaired R hand FM     # of short term goals set= 3    # of short term goals met= 0          Occupational Therapy Goals           Problem: Bathing     Dates: Start: 06/21/18       Goal: STG-Within one week, patient will bathe     Dates: Start: 06/21/18       Description: 1) Individualized Goal:  Body with mod I using AE/AD/techniques as needed.  2) Interventions:  OT Group Therapy, OT Self Care/ADL, OT Cognitive Skill Dev, OT Community Reintegration, OT Manual Ther Technique, OT Neuro Re-Ed/Balance, OT Therapeutic Activity, OT Evaluation and OT Therapeutic Exercise       Note:     Goal Note filed on 06/25/18 1152 by Erickson Arita MS,OTR/L    Goal: STG-Within one week, patient will bathe  Outcome: NOT MET  Supervised                  Problem: Dressing     Dates: Start: 06/21/18       Goal: STG-Within one week, patient will dress LB     Dates: Start: 06/21/18       Description: 1) Individualized Goal:  with mod I using AE/AD/techniques as needed.  2) Interventions:  OT Group Therapy, OT Self Care/ADL, OT Cognitive Skill Dev, OT Community Reintegration, OT Manual Ther Technique, OT Neuro Re-Ed/Balance, OT Therapeutic Activity, OT Evaluation and OT Therapeutic Exercise     Note:     Goal Note filed on 06/25/18 1152 by Erickson Arita MS,OTR/L    Goal: STG-Within one week, patient will dress LB  Outcome: NOT MET  Supervised                  Problem: OT Long Term Goals     Dates: Start: 06/21/18       Goal: LTG-By discharge, patient will complete basic self care tasks     Dates: Start: 06/21/18       Description:   1) Individualized Goal:  Body independently.  2) Interventions:  OT Group Therapy, OT Self Care/ADL, OT Cognitive Skill Dev, OT Community Reintegration, OT Manual Ther Technique, OT Neuro Re-Ed/Balance, OT  Therapeutic Activity, OT Evaluation and OT Therapeutic Exercise           Goal: LTG-By discharge, patient will perform bathroom transfers     Dates: Start: 06/21/18       Description: 1) Individualized Goal: independently.  2) Interventions:  OT Group Therapy, OT Self Care/ADL, OT Cognitive Skill Dev, OT Community Reintegration, OT Manual Ther Technique, OT Neuro Re-Ed/Balance, OT Therapeutic Activity, OT Evaluation and OT Therapeutic Exercise           Goal: LTG-By discharge, patient will complete basic home management     Dates: Start: 06/21/18       Description: 1) Individualized Goal:  independently.  2) Interventions:  OT Group Therapy, OT Self Care/ADL, OT Cognitive Skill Dev, OT Community Reintegration, OT Manual Ther Technique, OT Neuro Re-Ed/Balance, OT Therapeutic Activity, OT Evaluation and OT Therapeutic Exercise             Problem: Toileting     Dates: Start: 06/21/18       Goal: STG-Within one week, patient will complete toileting tasks     Dates: Start: 06/21/18       Description: 1) Individualized Goal:  with mod I using AE/AD/techniques as needed.  2) Interventions:  OT Group Therapy, OT Self Care/ADL, OT Cognitive Skill Dev, OT Community Reintegration, OT Manual Ther Technique, OT Neuro Re-Ed/Balance, OT Therapeutic Activity, OT Evaluation and OT Therapeutic Exercise     Note:     Goal Note filed on 06/25/18 1152 by Erickson Arita MS,OTR/L    Goal: STG-Within one week, patient will complete toileting tasks  Outcome: NOT MET  Supervised                      Section completed by:  Erickson Arita MS,OTR/L

## 2018-06-25 NOTE — CARE PLAN
Problem: Bowel/Gastric:  Goal: Normal bowel function is maintained or improved  Outcome: PROGRESSING SLOWER THAN EXPECTED  Patient's LBM was 06/22/2018, patient agreeable to taking prn Senna. Encouraged him to drink plenty of fluids and stay as active as possible to promote gastric motility. Will continue to monitor.

## 2018-06-25 NOTE — CARE PLAN
Problem: Communication  Goal: The ability to communicate needs accurately and effectively will improve  Outcome: PROGRESSING AS EXPECTED  Discussed with patient the plan of care for the evening, he had questions in regards to his loop recorder. His gf Rosanne (see note) had many questions in regards to the medications he is taking and what the next steps are for his care. She would like to speak with Dr Cedillo as well as Migdalia.

## 2018-06-25 NOTE — CARE PLAN
Problem: Balance  Goal: STG-Within one week, patient will  1) Individualized goal:  Pt with qualify as low fall risk on standardized balance assessment.   2) Interventions:  PT Group Therapy, PT Gait Training, PT Therapeutic Exercises, PT TENS Application, PT Neuro Re-Ed/Balance, PT Aquatic Therapy, PT Therapeutic Activity, PT Manual Therapy and PT Evaluation     Outcome: NOT MET  Pt limited by mild right neglect     Problem: Mobility  Goal: STG-Within one week, patient will ambulate community distances  1) Individualized goal:  Pt will AMB 1,500ft with head turns and on uneven surface in community, SPV  2) Interventions:  PT Group Therapy, PT Gait Training, PT Therapeutic Exercises, PT TENS Application, PT Neuro Re-Ed/Balance, PT Aquatic Therapy, PT Therapeutic Activity, PT Manual Therapy and PT Evaluation     Outcome: MET Date Met: 06/25/18    Goal: STG-Within one week, patient will ambulate up/down flight of stairs  1) Individualized goal:  Pt will AMB up/down a flight of stairs with SPV  2) Interventions:  PT Group Therapy, PT Gait Training, PT Therapeutic Exercises, PT TENS Application, PT Neuro Re-Ed/Balance, PT Aquatic Therapy, PT Therapeutic Activity, PT Manual Therapy and PT Evaluation     Outcome: MET Date Met: 06/25/18

## 2018-06-25 NOTE — PROGRESS NOTES
Rehab Progress Note     Encounter date: 6/25/2018  Today I met with the patient face to face in the hallway  Chief Complaint:  Acute CVA (cerebrovascular accident) (HCC) , frustration with reading     Interval Events (subjective)  Mr. Mari reports that he is doing well overall. He reports that his foot pain is improving. We discussed the orthopedic visit and the Darco shoe. He denies any fevers, chills, chest pain, or shortness of breath. He reports that his difficulty with word finding is gradually improving. He reports that his age Alexia is stable and very frustrating.    Objective:  VITAL SIGNS: /78   Pulse 69   Temp 36.6 °C (97.9 °F)   Resp 18   Ht 1.829 m (6')   Wt 95.9 kg (211 lb 6.7 oz)   SpO2 96%   BMI 28.67 kg/m²     No results found for this or any previous visit (from the past 72 hour(s)).    Current Facility-Administered Medications   Medication Frequency   • lisinopril (PRINIVIL) tablet 2.5 mg Q DAY   • enoxaparin (LOVENOX) inj 40 mg DAILY   • atorvastatin (LIPITOR) tablet 80 mg Q EVENING   • vitamin D (cholecalciferol) tablet 2,000 Units DAILY   • Respiratory Care per Protocol Continuous RT   • Pharmacy Consult Request ...Pain Management Review 1 Each PRN   • acetaminophen (TYLENOL) tablet 650 mg Q4HRS PRN   • artificial tears 1.4 % ophthalmic solution 1 Drop PRN   • benzocaine-menthol (CEPACOL) lozenge 1 Lozenge Q2HRS PRN   • hydrALAZINE (APRESOLINE) tablet 25 mg Q8HRS PRN   • mag hydrox-al hydrox-simeth (MAALOX PLUS ES or MYLANTA DS) suspension 20 mL Q2HRS PRN   • ondansetron (ZOFRAN ODT) dispertab 4 mg 4X/DAY PRN    Or   • ondansetron (ZOFRAN) syringe/vial injection 4 mg 4X/DAY PRN   • traZODone (DESYREL) tablet 50 mg QHS PRN   • sodium chloride (OCEAN) 0.65 % nasal spray 2 Spray PRN   • aspirin (ASA) tablet 325 mg DAILY   • docusate sodium (COLACE) capsule 100 mg DAILY    And   • senna-docusate (PERICOLACE or SENOKOT S) 8.6-50 MG per tablet 1 Tab QDAY PRN    And   • lactulose 20  GM/30ML solution 30 mL Q24HRS PRN    And   • bisacodyl (DULCOLAX) suppository 10 mg QDAY PRN       Exam Date: 6/25/2018    General:  Awake, alert, oriented, no acute distress,  Walking back from session with SLP  HEENT:  Minimal right facial droop  Cardiac: regular rate and rhythm  Lungs: clear to auscultation bilaterally.   Abdomen: soft; non tender, non distended, bowel sounds present and normoactive  Extremities: No edema in the bilateral lower limbs.  Wearing Darco shoe on right   Neuro:   Ongoing expressive aphasia, but this is gradually improving.  Fluidity of gait and reciprocal step pattern is improving    Orders Placed This Encounter   Procedures   • Diet Order     Standing Status:   Standing     Number of Occurrences:   1     Order Specific Question:   Diet:     Answer:   Regular [1]       Assessment:  Active Hospital Problems    Diagnosis   • *Acute CVA (cerebrovascular accident) (HCC)   • Vitamin D deficiency   • Aphasia due to acute cerebrovascular accident (CVA) (HCC)   • Hyperlipidemia   • Essential hypertension       Medical Decision Making and Plan:  THIS PATIENT IS ON THE STROKE PATHWAY     Mr. Mari is a 68 year old male admitted for rehabilitation on June 20 following ischemic strokes     Left frontal, parietal, temporal, occipital, and basal ganglia ischemic strokes presumed cardioembolic source   Aphasia  Alexia  Right Hemiparesis (dominant side)  Continue comprehensive rehabilitation    Will need cardiology follow-up to assess loop recorder results    Aspirin 325 mg daily     Hypertension  Systolic blood pressure ranging last 24 hours has improved to 123-136 mmHg  Continuing lisinopril 2.5 mg daily--this was initiated June 23    Right second metatarsal fracture  Orthopedic surgery recommends rigid bottom shoe and conservative cares     Hyperlipidemia  Lipitor 80 mg daily at bedtime     Vitamin D deficiency  Vitamin D was 14 on admission, so we begin supplementation with cholecalciferol  2000 units daily    DVT prophylaxis  Lovenox 40 g daily     Estimated discharge: June 28  He will need supervision and outpatient therapies.      IDT Team Conference 6/25/2018  I was present and led the interdisciplinary team conference on 6/25/2018, in addition to my daily follow up visit with the patient.       RN:  He is continent of bowel and bladder.  Pain is well controlled.       PT:  He has some right neglect and coordination deficits.       OT:  Aphasia and right upper limb weakness and coordination are limited.  He has delayed RUE activation.        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  Sentence level reading is 50%.  There is potentially visual field deficit, but we will continue to monitor.  Complex skills are more limiting.  He perseverates with complex tasks.       Total time:  35 minutes.  I spent greater than 50% of the time for patient care, counseling, and coordination on this date, including unit/floor time, and face-to-face time with the patient as per interval events and assessment and plan above. Topics discussed included functional progress, it Alexia, dysarthria, foot fracture        Xavi Cedillo M.D.  6/25/2018

## 2018-06-25 NOTE — PROGRESS NOTES
Called and left a message for Migdalia in regards to Rosanne's questions about notary/POA.    OT ACUTE Initial Evaluation    Pt seen on 8 center nursing unit.                                                          Frequency Comments: M, W, TH, F     Admitting complaint:: SOB (shortness of breath) [R06.02]  Generalized weakness [R53.1]  Elevated troponin [R79.89]  Acute on chronic congestive heart failure, unspecified congestive heart failure type [I50.9]                                                                                         Precautions  Other Precautions: Fall in March 2016. Hasn't been the same since (01/10/17 1116)    Co-morbidities:   Patient Active Problem List   Diagnosis   • At risk for falls   • Chronic otitis externa   • DJD (degenerative joint disease)   • Diabetes mellitus type II   • Dyslipidemia   • Surgical menopause   • Hypertension   • Sensorineural hearing loss   • Sigmoid diverticulosis   • Osteopenia   • Pain   • S/P ARTHROPLASTY OF THE KNEE-LEFT   • Retinopathy   • HOCM (hypertrophic obstructive cardiomyopathy)   • Coronary atherosclerosis of native coronary artery   • Nontoxic multinodular goiter   • Acute kidney failure, unspecified   • Sprain of neck   • Disorders of bursae and tendons in shoulder region, unspecified   • Hypertrophic cardiomyopathy   • CKD (chronic kidney disease) stage 3, GFR 30-59 ml/min   • Pain in both feet   • OM (onychomycosis)   • Pharyngitis   • Cough   • Acute URI   • SOB (shortness of breath)   • Acute on chronic diastolic heart failure   • Closed displaced fracture of base of fourth metacarpal bone of right hand       ASSESSMENT:  Occupational Therapy (OT) orders received due to impairments in ADL and instrumental-ADLS related to weakness, fatigue and medical status. The pt is a 89 y.o. Lady admitted from her senior apt with SOB, elevated troponin likely releated to CKD III. SHe has ample amount of support from her dtr with cooking/shoping/laundry and comes 3x to help with showering. Pt uses a 4 w/w since March when she fell. She has memory  deficits but is pleasant, oriented to place and self.The patient is currently functioning at moderate assist. The patient needs to be at a minimal assist level for safe return to prior living situation.     Task Modification: clinical decision making of low complexity, no task modification         The patient will benefit from continued skilled OT to address these deficits. The prognosis for goal achievement is fair.    See Flowsheet row data below for session detail and goals.     EDUCATION:  The patient was educated on the role of OT in the Acute care setting. The plan of care and goals were discussed and  Needs reinforcement      RECOMMENDATIONS FOR DISCHARGE:  Recommendations for Discharge: OT: Post acute therapy (pt likely wanting home. Home therapy recommended with assist) (01/10/17 1116)    OT Identified Barriers to Discharge: medical, weakness     PT/OT Mobility Equipment for Discharge: no needs  (01/10/17 1120)  PT/OT ADL Equipment for Discharge: has shower chair/grab bar by the toilet, has reacher but doesn't use (01/10/17 1116)    ICU Mobility Assesment (PERME):         PLAN: Continue skilled OT, including the following Treatment Interventions: ADL retraining;Functional transfer training;UE strengthening/ROM;Endurance training;Patient/Family training;Equipment eval/education (01/10/17 1116)     Treatment Plan for Next Session: ADLs at the sink seated, LE dressing with AE teaching                                                          SUBJECTIVE: Patient's Personal Goal: to return home (01/10/17 1116)   Subjective: Pt stated 'can they take this weight off my stomach\" (01/10/17 1116)  Subjective/Objective Comments: RN, ok'd session.  (01/10/17 1116)    OBJECTIVE:Basic Lines: Telemetry;Capped IV (01/10/17 1120)  Safety Measures: Alarms (01/10/17 1120)    RN reported Jalloh Fall Scale Score: 60       Last 24 hours of Functional Data     ADLs  Self Cares/ADL's  Grooming Assistance: Supervision;Standing at  sink (01/10/17 1116)  Grooming/Oral Hygiene Deficit: Wash/dry hands (01/10/17 1116)  Upper Body Dressing Assistance: Set-up;Minimal Assist (Min);Edge of bed (01/10/17 1116)  Lower Body Clothing Assistance: Moderate Assist (Mod);Edge of bed (01/10/17 1116)  Lower Body Dressing Deficit: Thread LLE into pants;Thread RLE into pants;Don/doff L sock;Don/doff R sock (01/10/17 1116)  Dressing Equipment Used: Dressing stick;Reacher (01/10/17 1116)  Toileting Assistance: Supervision (01/10/17 1116)  Toileting Equipment Used: Grab bar use (01/10/17 1116)  Self Cares/ADL's Comments #1: she was educated on use of reacher to meche pants and then sock aid. Still Homero/mod A overall with memory deficits. She toileted supervision/Homero. She washed her hands with supervision for hand washing. Doffed her night gown and donned hospital with assist/set up. Donning and doffing of pants with reacher teaching and mod A overall.  (01/10/17 1116)    Household mobility  Household Mobility  Supine to Sit: Minimal Assist (Min) (01/10/17 1116)  Sit to Stand: Supervision (01/10/17 1116)  Stand to Sit: Supervision;Minimal Assist (Min) (01/10/17 1116)  Toilet Transfers: Minimal Assist (Min) (01/10/17 1116)  Transfer Equipment: gait belt, 4 w/w (01/10/17 1116)  Sitting - Static: Modified Independent;Supervision (01/10/17 1116)  Standing - Static: Supervision (01/10/17 1116)  Standing - Dynamic: Supervision;Minimal Assist (Min) (01/10/17 1116)  Household Mobility Comments #1: she needed Homero out of bed. Stood supervision from EOB. Toilet transfer down Homero but stood mondified independent. She ambulated 2x50 ft with the 4 w/w, light Homero for balance (01/10/17 1116)    Home Management       Tolerance  OT Activity Tolerance  Activity Tolerance: 1:1 Activity to rest (01/10/17 1116)  Vital Signs: on room air (01/10/17 1116)  Activity Tolerance Comments: pulse ox was 95. Some SOB with activity but feeling better (01/10/17  1116)    Cognition  Communication/Cognition  Communication: Clear speech (01/10/17 1116)  Overall Cognitive Status: Within Functional Limits (01/10/17 1116)  Memory: Decreased short term memory (01/10/17 1116)  Cognition Comments: pt with memory deficits (01/10/17 1116)    Patient's Personal Goal: to return home (01/10/17 1116)    Therapy Goals:    Goals  Short Term Goals to Be Reviewed On: 01/17/17 (01/10/17 1116)  Short Term Goals Are The Same as Discharge Goals: Yes (01/10/17 1116)  Goal Agreement: Patient agrees with goals and treatment plan (01/10/17 1116)  Grooming Discharge Goal 1: independent (01/10/17 1116)  Bathing Discharge Goal 1: bathing seated with Homero and cues (01/10/17 1116)  Dressing Discharge Goal 1: LE dressing modified independent with use of AD prn (01/10/17 1116)  Toileting Discharge Goal 1: modifed independent (01/10/17 1116)  Home Setting Transfer Discharge Goal 1: modified independent (01/10/17 1116)    Total Treatment Time:  OT Time Spent: 61 minutes (01/10/17 1116)      See OT flowsheet for full details regarding the OT therapy provided.

## 2018-06-26 PROCEDURE — 99232 SBSQ HOSP IP/OBS MODERATE 35: CPT | Performed by: PHYSICAL MEDICINE & REHABILITATION

## 2018-06-26 PROCEDURE — A9270 NON-COVERED ITEM OR SERVICE: HCPCS | Performed by: PHYSICAL MEDICINE & REHABILITATION

## 2018-06-26 PROCEDURE — 700112 HCHG RX REV CODE 229: Performed by: PHYSICAL MEDICINE & REHABILITATION

## 2018-06-26 PROCEDURE — 97112 NEUROMUSCULAR REEDUCATION: CPT

## 2018-06-26 PROCEDURE — 770010 HCHG ROOM/CARE - REHAB SEMI PRIVAT*

## 2018-06-26 PROCEDURE — 700102 HCHG RX REV CODE 250 W/ 637 OVERRIDE(OP): Performed by: PHYSICAL MEDICINE & REHABILITATION

## 2018-06-26 PROCEDURE — 92507 TX SP LANG VOICE COMM INDIV: CPT

## 2018-06-26 PROCEDURE — 700111 HCHG RX REV CODE 636 W/ 250 OVERRIDE (IP): Performed by: PHYSICAL MEDICINE & REHABILITATION

## 2018-06-26 RX ADMIN — VITAMIN D, TAB 1000IU (100/BT) 2000 UNITS: 25 TAB at 08:45

## 2018-06-26 RX ADMIN — ASPIRIN 325 MG ORAL TABLET 325 MG: 325 PILL ORAL at 08:45

## 2018-06-26 RX ADMIN — DOCUSATE SODIUM 100 MG: 100 CAPSULE, LIQUID FILLED ORAL at 08:45

## 2018-06-26 RX ADMIN — ENOXAPARIN SODIUM 40 MG: 100 INJECTION SUBCUTANEOUS at 08:45

## 2018-06-26 RX ADMIN — STANDARDIZED SENNA CONCENTRATE AND DOCUSATE SODIUM 1 TABLET: 8.6; 5 TABLET, FILM COATED ORAL at 20:07

## 2018-06-26 RX ADMIN — ATORVASTATIN CALCIUM 80 MG: 40 TABLET, FILM COATED ORAL at 20:09

## 2018-06-26 RX ADMIN — LISINOPRIL 2.5 MG: 5 TABLET ORAL at 05:15

## 2018-06-26 ASSESSMENT — PATIENT HEALTH QUESTIONNAIRE - PHQ9
SUM OF ALL RESPONSES TO PHQ9 QUESTIONS 1 AND 2: 0
1. LITTLE INTEREST OR PLEASURE IN DOING THINGS: NOT AT ALL
2. FEELING DOWN, DEPRESSED, IRRITABLE, OR HOPELESS: NOT AT ALL

## 2018-06-26 NOTE — PROGRESS NOTES
Rehab Progress Note     Encounter date: 6/26/2018  Today I met with the patient face to face in the cafeteria during speech session   Chief Complaint:  Acute CVA (cerebrovascular accident) (HCC) , visual complaints     Interval Events (subjective)  Mr. Mari reports that he has had fluctuating ability to identify letters today. His significant other indicates that when he was watching television last night he reported that the right half of the screen was blank. I discussed his vision with him today, but he had difficulty expressing his concerns. He reports that the television is blank on the right side, but when he is looking down the hallway, he is able to see the right side. We discussed the area of his stroke and the potential for involvement of the visual pathways. He denies any fevers, chills, headache, dizziness, chest pain, or shortness of breath. He does report some sensitivity to sound.    Objective:  VITAL SIGNS: /85   Pulse 75   Temp 36.4 °C (97.6 °F)   Resp 18   Ht 1.829 m (6')   Wt 95.9 kg (211 lb 6.7 oz)   SpO2 98%   BMI 28.67 kg/m²     No results found for this or any previous visit (from the past 72 hour(s)).    Current Facility-Administered Medications   Medication Frequency   • lisinopril (PRINIVIL) tablet 2.5 mg Q DAY   • enoxaparin (LOVENOX) inj 40 mg DAILY   • atorvastatin (LIPITOR) tablet 80 mg Q EVENING   • vitamin D (cholecalciferol) tablet 2,000 Units DAILY   • Respiratory Care per Protocol Continuous RT   • Pharmacy Consult Request ...Pain Management Review 1 Each PRN   • acetaminophen (TYLENOL) tablet 650 mg Q4HRS PRN   • artificial tears 1.4 % ophthalmic solution 1 Drop PRN   • benzocaine-menthol (CEPACOL) lozenge 1 Lozenge Q2HRS PRN   • hydrALAZINE (APRESOLINE) tablet 25 mg Q8HRS PRN   • mag hydrox-al hydrox-simeth (MAALOX PLUS ES or MYLANTA DS) suspension 20 mL Q2HRS PRN   • ondansetron (ZOFRAN ODT) dispertab 4 mg 4X/DAY PRN    Or   • ondansetron (ZOFRAN) syringe/vial  injection 4 mg 4X/DAY PRN   • traZODone (DESYREL) tablet 50 mg QHS PRN   • sodium chloride (OCEAN) 0.65 % nasal spray 2 Spray PRN   • aspirin (ASA) tablet 325 mg DAILY   • docusate sodium (COLACE) capsule 100 mg DAILY    And   • senna-docusate (PERICOLACE or SENOKOT S) 8.6-50 MG per tablet 1 Tab QDAY PRN    And   • lactulose 20 GM/30ML solution 30 mL Q24HRS PRN    And   • bisacodyl (DULCOLAX) suppository 10 mg QDAY PRN       Exam Date: 6/26/2018    General:  Awake, alert, oriented, no acute distress.  Working with SLP  HEENT:   Trace right facial droop  Cardiac: regular rate and rhythm  Lungs: clear to auscultation bilaterally.   Abdomen: soft; non tender, non distended, bowel sounds present and normoactive  Extremities: No edema in the bilateral lower limbs  Neuro:   Persistent difficulty with expressive aphasia.  I repeated the admission exam looking at visual field testing. With manual confrontation, the patient continues to report full visual fields.    I had the patient perform a line bisection task today. He had difficulty finding midline and placed the bisecting line 75% of the way to the left side.      Orders Placed This Encounter   Procedures   • Diet Order     Standing Status:   Standing     Number of Occurrences:   1     Order Specific Question:   Diet:     Answer:   Regular [1]       Assessment:  Active Hospital Problems    Diagnosis   • *Acute CVA (cerebrovascular accident) (HCC)   • Vitamin D deficiency   • Aphasia due to acute cerebrovascular accident (CVA) (HCC)   • Hyperlipidemia   • Essential hypertension       Medical Decision Making and Plan:  THIS PATIENT IS ON THE STROKE PATHWAY     Mr. Mari is a 68 year old male admitted for rehabilitation on June 20 following ischemic strokes     Left frontal, parietal, temporal, occipital, and basal ganglia ischemic strokes presumed cardioembolic source   Aphasia  Alexia  Right Hemiparesis (dominant side)  Questionable right homonymous  hemianopsia  Continue comprehensive rehabilitation    Will need cardiology follow-up to assess loop recorder results    Aspirin 325 mg daily    Based on intermittent report, the patient seems to be having some right-sided neglect versus hemianopsia. Because of his difficulty with expressive aphasia, it is difficult to tease out the subtle difference between these 2 deficits. I've requested neuro-ophthalmology follow-up the patient, but this will likely be performed as an outpatient.     Hypertension  Systolic blood pressure range remains better controlled at 128-135   Lisinopril 2.5 mg daily--this was initiated June 23    Right second metatarsal fracture  Orthopedic surgery recommends rigid bottom shoe and conservative cares     Hyperlipidemia  Lipitor 80 mg daily at bedtime     Vitamin D deficiency  Vitamin D was 14 on admission, so we begin supplementation with cholecalciferol 2000 units daily    DVT prophylaxis  Lovenox 40 g daily     Estimated discharge: June 28  He will need supervision and outpatient therapies.      Total time:  28 minutes.  I spent greater than 50% of the time for patient care, counseling, and coordination on this date, including unit/floor time, and face-to-face time with the patient as per interval events and assessment and plan above. Topics discussed included functional progress, ongoing aphasia, ongoing Alexia, potential visual field deficit. I discussed his care with case management today and made the referral for neuro-ophthalmology        Xavi Cedillo M.D.  6/26/2018

## 2018-06-26 NOTE — CARE PLAN
Problem: Safety  Goal: Will remain free from injury  Patient uses call light consistently and appropriately this shift.  Waits for assistance when needed and does not attempt self transfer.  Able to verbalize needs.  Will continue to monitor.    Problem: Infection  Goal: Will remain free from infection  Patient remains free from s/s infection; afebrile.  Will continue to monitor.

## 2018-06-26 NOTE — DISCHARGE PLANNING
Case Management:  Met with patient and reviewed team conference discussion.  Will plan for d/c on Thursday.  Recommendation is for outpatient therapy and patient is agreeable to [facility] outpatient therapy.  His significant other called this am and request assistance with damián for DPOA.  I will connect with her in am to accomplish this and patient is agreeable.  Will follow.

## 2018-06-27 DIAGNOSIS — I63.9 ACUTE CVA (CEREBROVASCULAR ACCIDENT) (HCC): ICD-10-CM

## 2018-06-27 PROCEDURE — 99232 SBSQ HOSP IP/OBS MODERATE 35: CPT | Performed by: PHYSICAL MEDICINE & REHABILITATION

## 2018-06-27 PROCEDURE — 770010 HCHG ROOM/CARE - REHAB SEMI PRIVAT*

## 2018-06-27 PROCEDURE — 92507 TX SP LANG VOICE COMM INDIV: CPT

## 2018-06-27 PROCEDURE — 97530 THERAPEUTIC ACTIVITIES: CPT

## 2018-06-27 PROCEDURE — 97116 GAIT TRAINING THERAPY: CPT

## 2018-06-27 PROCEDURE — 700111 HCHG RX REV CODE 636 W/ 250 OVERRIDE (IP): Performed by: PHYSICAL MEDICINE & REHABILITATION

## 2018-06-27 PROCEDURE — 700102 HCHG RX REV CODE 250 W/ 637 OVERRIDE(OP): Performed by: PHYSICAL MEDICINE & REHABILITATION

## 2018-06-27 PROCEDURE — A9270 NON-COVERED ITEM OR SERVICE: HCPCS | Performed by: PHYSICAL MEDICINE & REHABILITATION

## 2018-06-27 PROCEDURE — 97535 SELF CARE MNGMENT TRAINING: CPT

## 2018-06-27 RX ORDER — ASPIRIN 325 MG
325 TABLET ORAL DAILY
Qty: 100 TAB | Refills: 2 | Status: SHIPPED | OUTPATIENT
Start: 2018-06-28 | End: 2019-09-20

## 2018-06-27 RX ORDER — LISINOPRIL 2.5 MG/1
2.5 TABLET ORAL DAILY
Qty: 30 TAB | Refills: 2 | Status: SHIPPED | OUTPATIENT
Start: 2018-06-28

## 2018-06-27 RX ORDER — ATORVASTATIN CALCIUM 80 MG/1
80 TABLET, FILM COATED ORAL EVERY EVENING
Qty: 30 TAB | Refills: 2 | Status: SHIPPED | OUTPATIENT
Start: 2018-06-27

## 2018-06-27 RX ORDER — ACETAMINOPHEN 325 MG/1
650 TABLET ORAL EVERY 4 HOURS PRN
Qty: 30 TAB | Refills: 0 | COMMUNITY
Start: 2018-06-27 | End: 2019-09-20

## 2018-06-27 RX ADMIN — ENOXAPARIN SODIUM 40 MG: 100 INJECTION SUBCUTANEOUS at 08:15

## 2018-06-27 RX ADMIN — ATORVASTATIN CALCIUM 80 MG: 40 TABLET, FILM COATED ORAL at 20:02

## 2018-06-27 RX ADMIN — ASPIRIN 325 MG ORAL TABLET 325 MG: 325 PILL ORAL at 08:15

## 2018-06-27 RX ADMIN — VITAMIN D, TAB 1000IU (100/BT) 2000 UNITS: 25 TAB at 08:15

## 2018-06-27 RX ADMIN — LISINOPRIL 2.5 MG: 5 TABLET ORAL at 05:17

## 2018-06-27 ASSESSMENT — PATIENT HEALTH QUESTIONNAIRE - PHQ9
SUM OF ALL RESPONSES TO PHQ9 QUESTIONS 1 AND 2: 0
SUM OF ALL RESPONSES TO PHQ9 QUESTIONS 1 AND 2: 0
1. LITTLE INTEREST OR PLEASURE IN DOING THINGS: NOT AT ALL
1. LITTLE INTEREST OR PLEASURE IN DOING THINGS: NOT AT ALL

## 2018-06-27 ASSESSMENT — ACTIVITIES OF DAILY LIVING (ADL)
TOILETING_LEVEL_OF_ASSIST: ABLE TO COMPLETE TOILETING WITHOUT ASSIST
TOILET_TRANSFER_LEVEL_OF_ASSIST: ABLE TO COMPLETE TOILET TRANSFER WITHOUT ASSIST
SHOWER_TRANSFER_LEVEL_OF_ASSIST: ABLE TO COMPLETE SHOWER TRANSFER WITHOUT ASSIST

## 2018-06-27 ASSESSMENT — PAIN SCALES - GENERAL: PAINLEVEL_OUTOF10: 0

## 2018-06-27 NOTE — DISCHARGE PLANNING
Late entry for yesterday, 6/26/18:  Received signed Certificate of Competency and notarized Advanced Directive. Copies given to PAR to scan into medical record and to the Unit Clerk to place in the patient's chart.

## 2018-06-27 NOTE — CARE PLAN
Problem: PT-Long Term Goals  Goal: LTG-By discharge, patient will ambulate  1) Individualized goal:  Pt will AMB >2000ft x 1 with no AD on even and uneven terrain, independently  2) Interventions:  PT Group Therapy, PT Gait Training, PT Therapeutic Exercises, PT TENS Application, PT Neuro Re-Ed/Balance, PT Aquatic Therapy, PT Therapeutic Activity, PT Manual Therapy and PT Evaluation     Outcome: MET Date Met: 06/27/18    Goal: LTG-By discharge, patient will transfer one surface to another  1) Individualized goal: Pt will perform all transfers mod I  2) Interventions:  PT Group Therapy, PT Gait Training, PT Therapeutic Exercises, PT TENS Application, PT Neuro Re-Ed/Balance, PT Aquatic Therapy, PT Therapeutic Activity, PT Manual Therapy and PT Evaluation     Outcome: MET Date Met: 06/27/18    Goal: LTG-By discharge, patient will perform home exercise program  1) Individualized goal:  Pt will perform HEP for balance, mod I  2) Interventions:  PT Group Therapy, PT Gait Training, PT Therapeutic Exercises, PT TENS Application, PT Neuro Re-Ed/Balance, PT Aquatic Therapy, PT Therapeutic Activity, PT Manual Therapy and PT Evaluation     Outcome: MET Date Met: 06/27/18

## 2018-06-27 NOTE — CARE PLAN
Problem: OT Long Term Goals  Goal: LTG-By discharge, patient will complete basic self care tasks    1) Individualized Goal:  Body independently.  2) Interventions:  OT Group Therapy, OT Self Care/ADL, OT Cognitive Skill Dev, OT Community Reintegration, OT Manual Ther Technique, OT Neuro Re-Ed/Balance, OT Therapeutic Activity, OT Evaluation and OT Therapeutic Exercise   Outcome: MET Date Met: 06/27/18    Goal: LTG-By discharge, patient will perform bathroom transfers  1) Individualized Goal: independently.  2) Interventions:  OT Group Therapy, OT Self Care/ADL, OT Cognitive Skill Dev, OT Community Reintegration, OT Manual Ther Technique, OT Neuro Re-Ed/Balance, OT Therapeutic Activity, OT Evaluation and OT Therapeutic Exercise   Outcome: MET Date Met: 06/27/18    Goal: LTG-By discharge, patient will complete basic home management  1) Individualized Goal:  independently.  2) Interventions:  OT Group Therapy, OT Self Care/ADL, OT Cognitive Skill Dev, OT Community Reintegration, OT Manual Ther Technique, OT Neuro Re-Ed/Balance, OT Therapeutic Activity, OT Evaluation and OT Therapeutic Exercise   Outcome: NOT MET

## 2018-06-27 NOTE — CARE PLAN
Problem: Safety  Goal: Will remain free from falls    Intervention: Assess risk factors for falls  Patient uses call light consistently and appropriately this shift.  Waits for assistance when needed and does not attempt self transfer.  Able to verbalize needs.  Will continue to monitor.

## 2018-06-27 NOTE — DISCHARGE SUMMARY
Rehabitation Discharge Summary    Admission Date: 6/20/2018    Discharge Date: 6/28/2018      Attending Provider:  Xavi Cedillo MD    Admission Diagnosis:   Active Hospital Problems    Diagnosis   • *Acute CVA (cerebrovascular accident) (HCC)   • Vitamin D deficiency   • Aphasia due to acute cerebrovascular accident (CVA) (HCC)   • Hyperlipidemia   • Essential hypertension       Discharge Diagnosis:  Active Hospital Problems    Diagnosis   • *Acute CVA (cerebrovascular accident) (HCC)   • Vitamin D deficiency   • Aphasia due to acute cerebrovascular accident (CVA) (HCC)   • Hyperlipidemia   • Essential hypertension       HPI per H&P by Dr. Cedillo:  Mr. Mari is a 68 year-old right hand dominant male with an unremarkable past medical history admitted to [facility] on 6/16/2018 11:59 PM after he initially presented to [facility] with over a day of numbness and weakness in the right hand with associated language deficits. The patient reported those symptoms gradually increased and he presented to the hospital for further evaluation.     MRI on June 17 showed occlusion of the distal left M1 with multifocal acute infarctions in the left frontal, parietal, temporal, and occipital lobes. MRA on June 17 showed occlusion of the distal left M1. He was outside of the window for thrombectomy/TPA.     Echocardiogram on June 18 showed normal left ventricular function and an ejection fraction of 65%. He had elevated cholesterol but a normal hemoglobin A1c at 5.6. EKG and monitoring showed normal sinus rhythm.     He was seen by neurology on June 17 and started on a statin, and aspirin.     Cardiology was consulted for assessment of implantable loop recorder due to the presumed cardioembolic etiology of the stroke.     He was allowed a period of permissive hypertension. Blood pressure today is 124/55 mmHg     6 click scores are 24 for mobility and 17 for ADLs. He was evaluated by physical therapy on  June 17 and required supervision for ambulation to 1000 feet. He was evaluated by occupational therapy on June 17 and required moderate assistance for toileting and bathing. He was evaluated by speech language pathology on June 19 and had nonfluent aphasia.     He was admitted to [facility] on June 20, 2018    Hospital Course by Problem List:  Left frontal, parietal, temporal, occipital, and basal ganglia ischemic strokes presumed cardioembolic source   Aphasia  Alexia  Right Hemiparesis (dominant side)  Questionable right homonymous hemianopsia  He participated in a comprehensive rehabilitation program. Functionally, his hemiparesis, gait, and activities of daily living improved quickly. He continues to be significantly limited by expressive aphasia, alexia, verbal apraxia, letter recognition, and there is concern for questionable right homonymous hemianopsia. He will continue outpatient intensive speech and language therapy. He will follow-up with Dr. Mckeon in stroke clinic.     He will need cardiology follow-up to assess loop recorder results.   Continue Aspirin 325 mg daily     Outpatient neuro-ophthalmology follow-up has been requested     Hypertension  Due to ongoing difficulty with blood pressures ranging from one 140-150, he was started on lisinopril 2.5 mg daily with improved blood pressure control. This should be monitored by his primary care provider and adjusted as needed. His discharge blood pressure was 117/73 mmHg.     Right second metatarsal fracture  After arrival at the [facility], his language skills improved, and he began to express complaints of pain related to the fall that occurred during his initial stroke symptoms. X-ray revealed a right second metatarsal fracture, and he was sent to [facility] outpatient clinic with recommendation for rigid bottom shoes and conservative care. He was outfitted with a Darco shoe to help protect the toe.      Hyperlipidemia  Lipitor  "80 mg daily at bedtime     Vitamin D deficiency  Vitamin D was 14 on admission, so we begin supplementation with cholecalciferol 2000 units daily    Functional Status at Discharge  Eatin - Independent  Eating Description:   (extra time)  Groomin - Independent  Grooming Description:  Supervision for safety  Bathin - Independent  Bathing Description:  Supervision for safety, Set-up of equipment  Upper Body Dressin - Independent  Upper Body Dressing Description:  Set-up of equipment, Supervision for safety  Lower Body Dressin - Independent  Lower Body Dressing Description:  7 - Independent  Discharge Location : Home  Patient Discharging with Assist of: Spouse / Significant Other  Level of Supervision Required: Intermittent Supervision (for IADLs)  Recommended Equipment for Discharge: None  Recommended Services Upon Discharge: Outpatient Occupational Therapy  Long Term Goals Met: 2  Long Term Goals Not Met: 1  Reason(s) for Goals Not Met: did not get opportunity to work on home management skills/IADLs  Criteria for Termination of Services: Maximum Function Achieved for Inpatient Rehabilitation  Walk:  6 - Modified Independent  Distance Walked:  Walks a minimum of 150 feet  Walk Description:   (increase time, 400ft x 2, no cues for pathfinding to room)  Wheelchair:  0 - Not tested, patient refused  Distance Propelled:      Wheelchair Description:     Stairs 5 - Standby Prompting/Supervision or Set-up  Stairs Description (12 x 1 6\" stairs SPV no UE support )     Comprehension Mode:  Both  Comprehension:  5 - Stand-by Prompting/Supervision or Set-up  Comprehension Description:  Verbal cues  Expression Mode:  Vocal  Expression:  4 - Minimal Assistance  Expression Description:  Verbal cueing  Social Interaction:  6 - Modified Independent  Social Interaction Description:  Increased time  Problem Solvin - Minimal Assistance  Problem Solving Description:  Therapy schedule, Verbal cueing, Increased " time  Memory:  4 - Minimal Assistance  Memory Description:  Verbal cueing, Therapy schedule, Supervision       Vital signs:  VITAL SIGNS: /73   Pulse 80   Temp 36.7 °C (98 °F)   Resp 18   Ht 1.829 m (6')   Wt 95.9 kg (211 lb 6.7 oz)   SpO2 94%   BMI 28.67 kg/m²     Physical Examination 6/28/2018:  General:  Awake, alert, oriented, no acute distress  HEENT:  Trace right facial droop  Cardiac: regular rate and rhythm  Lungs: clear to auscultation bilaterally.   Abdomen: soft; non tender, non distended, bowel sounds present and normoactive  Extremities: No edema in the bilateral lower limbs  Musculoskeletal:  Wearing Darco shoe on right foot. Stable bruising in the second MTP area  Neuro:   Ongoing gradual improvement in expressive aphasia. Verbal apraxia is stable. Letter recognition remains significantly impaired.      Discharge Medication:     Medication List      START taking these medications      Instructions   Cholecalciferol 2000 UNIT Tabs   Take 1 Tab by mouth every day.  Dose:  2000 Units     lisinopril 2.5 MG Tabs  Commonly known as:  PRINIVIL   Take 1 Tab by mouth every day.  Dose:  2.5 mg        CHANGE how you take these medications      Instructions   acetaminophen 325 MG Tabs  What changed:  · when to take this  · reasons to take this  Commonly known as:  TYLENOL   Take 2 Tabs by mouth every four hours as needed.  Dose:  650 mg        CONTINUE taking these medications      Instructions   aspirin 325 MG Tabs  Commonly known as:  ASA   Take 1 Tab by mouth every day.  Dose:  325 mg     atorvastatin 80 MG tablet  Commonly known as:  LIPITOR   Take 1 Tab by mouth every evening.  Dose:  80 mg        STOP taking these medications    enoxaparin 40 MG/0.4ML Soln inj  Commonly known as:  LOVENOX     ondansetron 4 MG Tbdp  Commonly known as:  ZOFRAN ODT     senna-docusate 8.6-50 MG Tabs  Commonly known as:  PERICOLACE or SENOKOT S            Discharge Diet:  Regular    Discharge Activity:  As  tolerated    Disposition:  Patient to discharge home with family support and community resources.       Follow-up:  DISCHARGE INSTRUCTIONS:  Follow up with your primary care provider (PCP) within 7-10 days of discharge to review your medications and take over your care.     If you develop chest pain, fever, chills, change in neurologic function (weakness, sensation changes, vision changes), or other concerning symptoms, seek immediate medical attention or call 911.      Follow up: please see Case Management Discharge Instructions for follow up appointment information, DME information, and other useful discharge planning information.     Do not drive until cleared by your PCP    Condition on Discharge:  Stable    35 minutes was spent on discharging this patient, including face-to-face time, prescription management, and the dictation of this note.    Xavi Cedillo M.D.  6/28/2018

## 2018-06-27 NOTE — PROGRESS NOTES
Rehab Progress Note     Encounter date: 6/27/2018  Today I met with the patient face to face in speech therapy  Chief Complaint:  Acute CVA (cerebrovascular accident) (HCC) , frustration with reading and speech    Interval Events (subjective)  Mr. Mari reports that he is doing well overall today besides his frustration with speech and reading. We discussed discharge plan and he was able to articulate his preferred pharmacy. He denies any fevers, chills, headache, dizziness, chest pain, shortness of breath. We discussed discharge plans for tomorrow.    Objective:  VITAL SIGNS: /81   Pulse 75   Temp 36.7 °C (98.1 °F)   Resp 18   Ht 1.829 m (6')   Wt 95.9 kg (211 lb 6.7 oz)   SpO2 97%   BMI 28.67 kg/m²     No results found for this or any previous visit (from the past 72 hour(s)).    Current Facility-Administered Medications   Medication Frequency   • lisinopril (PRINIVIL) tablet 2.5 mg Q DAY   • enoxaparin (LOVENOX) inj 40 mg DAILY   • atorvastatin (LIPITOR) tablet 80 mg Q EVENING   • vitamin D (cholecalciferol) tablet 2,000 Units DAILY   • Respiratory Care per Protocol Continuous RT   • Pharmacy Consult Request ...Pain Management Review 1 Each PRN   • acetaminophen (TYLENOL) tablet 650 mg Q4HRS PRN   • artificial tears 1.4 % ophthalmic solution 1 Drop PRN   • benzocaine-menthol (CEPACOL) lozenge 1 Lozenge Q2HRS PRN   • hydrALAZINE (APRESOLINE) tablet 25 mg Q8HRS PRN   • mag hydrox-al hydrox-simeth (MAALOX PLUS ES or MYLANTA DS) suspension 20 mL Q2HRS PRN   • ondansetron (ZOFRAN ODT) dispertab 4 mg 4X/DAY PRN    Or   • ondansetron (ZOFRAN) syringe/vial injection 4 mg 4X/DAY PRN   • traZODone (DESYREL) tablet 50 mg QHS PRN   • sodium chloride (OCEAN) 0.65 % nasal spray 2 Spray PRN   • aspirin (ASA) tablet 325 mg DAILY   • docusate sodium (COLACE) capsule 100 mg DAILY    And   • senna-docusate (PERICOLACE or SENOKOT S) 8.6-50 MG per tablet 1 Tab QDAY PRN    And   • lactulose 20 GM/30ML solution 30 mL  "Q24HRS PRN    And   • bisacodyl (DULCOLAX) suppository 10 mg QDAY PRN       Exam Date: 6/27/2018    General:  Awake, alert, oriented, no acute distress  HEENT:  Trace right facial droop  Cardiac: regular rate and rhythm  Lungs: clear to auscultation bilaterally.   Abdomen: soft; non tender, non distended, bowel sounds present and normoactive  Extremities: No edema in the bilateral lower limbs  Neuro:   Persistent fluctuations in expressive aphasia, verbal apraxia, and letter recognition.  He seems to have particular difficulty with the letters \"r\" and \"m/M\"      Orders Placed This Encounter   Procedures   • Diet Order     Standing Status:   Standing     Number of Occurrences:   1     Order Specific Question:   Diet:     Answer:   Regular [1]       Assessment:  Active Hospital Problems    Diagnosis   • *Acute CVA (cerebrovascular accident) (HCC)   • Vitamin D deficiency   • Aphasia due to acute cerebrovascular accident (CVA) (HCC)   • Hyperlipidemia   • Essential hypertension       Medical Decision Making and Plan:  THIS PATIENT IS ON THE STROKE PATHWAY     Mr. Mari is a 68 year old male admitted for rehabilitation on June 20 following ischemic strokes     Left frontal, parietal, temporal, occipital, and basal ganglia ischemic strokes presumed cardioembolic source   Aphasia  Alexia  Right Hemiparesis (dominant side)  Questionable right homonymous hemianopsia  Coordinating discharge for tomorrow    Will need cardiology follow-up to assess loop recorder results    Aspirin 325 mg daily    Outpatient neuro-ophthalmology follow-up     Hypertension  Improved blood pressure control with a blood pressure of 117/81 mmHg today.  Lisinopril 2.5 mg daily--this was initiated June 23    Right second metatarsal fracture  Orthopedic surgery recommends rigid bottom shoe and conservative cares     Hyperlipidemia  Lipitor 80 mg daily at bedtime     Vitamin D deficiency  Vitamin D was 14 on admission, so we begin supplementation " with cholecalciferol 2000 units daily    DVT prophylaxis  Lovenox 40 g daily     Estimated discharge: June 28  He will need supervision and outpatient therapies.          Total time:  25 minutes.  I spent greater than 50% of the time for patient care, counseling, and coordination on this date, including unit/floor time, and face-to-face time with the patient as per interval events and assessment and plan above. Topics discussed included functional progress, discharge planning, vision, reading, outpatient speech.  Discussed with SLP        Xavi Cedillo M.D.  6/27/2018

## 2018-06-27 NOTE — DISCHARGE PLANNING
Discharge Location   Home with Outpatient Services     Agency Name / Address / Phone     [facility] at [address] [place] [address], [phone] (they will call you to schedule visits)     Outpatient Services   Occupational Therapy; Physical Therapy; Speech Therapy     Medical equipment Provider / Phone   No additional equipment ordered.              Follow-up With  Details  Why  Contact Info   BRADY Lujan (Cardiology)  On 7/12/2018 Thursday at 1:40 pm  [address]  [address]  [facility] [id number] [phone]     CEASAR Kinsey (Orthopaedics)  On 7/18/2018 Wednesday at 10:00 am (go to the building around the corner at [address]  [address]  [facility] [address]  [phone]     Kalina Fernandez M.D. (Primary Care)  On 7/24/2018 Tuesday at 1:00 pm (records will be sent)  [address] [facility] [id number] [phone]     Shelby Madden P.A.-C. (Neurology)    appointment has been requested. You will recieve a phone call with this information.  [address] [facility] [address]  [facility] [id number] [phone]     Rajiv Garzon D.O. (Neuro Opthalmology)    appointment has been requested and records faxed. You will recieve a call with appointment if accepted.  [address] [facility] [facility] [address]  [facility] [id number] [phone]            Yesica Mckeon M.D. (Physiatry)           On 8/6/18 Monday at 3:00 pm                       [address] [facility] [place] [address]                            [phone]

## 2018-06-28 VITALS
WEIGHT: 211.42 LBS | OXYGEN SATURATION: 94 % | TEMPERATURE: 98 F | SYSTOLIC BLOOD PRESSURE: 117 MMHG | HEIGHT: 72 IN | RESPIRATION RATE: 18 BRPM | HEART RATE: 80 BPM | BODY MASS INDEX: 28.64 KG/M2 | DIASTOLIC BLOOD PRESSURE: 73 MMHG

## 2018-06-28 PROCEDURE — 700102 HCHG RX REV CODE 250 W/ 637 OVERRIDE(OP): Performed by: PHYSICAL MEDICINE & REHABILITATION

## 2018-06-28 PROCEDURE — A9270 NON-COVERED ITEM OR SERVICE: HCPCS | Performed by: PHYSICAL MEDICINE & REHABILITATION

## 2018-06-28 PROCEDURE — 99239 HOSP IP/OBS DSCHRG MGMT >30: CPT | Performed by: PHYSICAL MEDICINE & REHABILITATION

## 2018-06-28 PROCEDURE — 92507 TX SP LANG VOICE COMM INDIV: CPT

## 2018-06-28 PROCEDURE — 700111 HCHG RX REV CODE 636 W/ 250 OVERRIDE (IP): Performed by: PHYSICAL MEDICINE & REHABILITATION

## 2018-06-28 PROCEDURE — 700112 HCHG RX REV CODE 229: Performed by: PHYSICAL MEDICINE & REHABILITATION

## 2018-06-28 RX ADMIN — ASPIRIN 325 MG ORAL TABLET 325 MG: 325 PILL ORAL at 08:03

## 2018-06-28 RX ADMIN — ENOXAPARIN SODIUM 40 MG: 100 INJECTION SUBCUTANEOUS at 09:13

## 2018-06-28 RX ADMIN — LISINOPRIL 2.5 MG: 5 TABLET ORAL at 05:14

## 2018-06-28 RX ADMIN — VITAMIN D, TAB 1000IU (100/BT) 2000 UNITS: 25 TAB at 08:03

## 2018-06-28 RX ADMIN — DOCUSATE SODIUM 100 MG: 100 CAPSULE, LIQUID FILLED ORAL at 08:03

## 2018-06-28 ASSESSMENT — PATIENT HEALTH QUESTIONNAIRE - PHQ9
1. LITTLE INTEREST OR PLEASURE IN DOING THINGS: NOT AT ALL
SUM OF ALL RESPONSES TO PHQ9 QUESTIONS 1 AND 2: 0

## 2018-06-28 ASSESSMENT — PAIN SCALES - GENERAL: PAINLEVEL_OUTOF10: 0

## 2018-06-28 NOTE — CARE PLAN
Problem: Speech/Swallowing LTGs  Goal: LTG-By discharge, patient will express  1) Individualized goal:  Basic needs and moderately complex sequence descriptions with 95% acc with use of word finding strategies with mod I.  2) Interventions:  SLP Speech Language Treatment and SLP Group Treatment     Outcome: DISCHARGED-GOAL NOT MET Date Met: 06/28/18  80-90% for word finding in structured description tasks.  Goal: LTG-By discharge, patient will  1) Individualized goal:  Read at sentence and simple paragraph level with 80% acc.  2) Interventions:  SLP Speech Language Treatment and SLP Group Treatment     Outcome: DISCHARGED-GOAL NOT MET Date Met: 06/28/18  Patient reads and comprehends single words with 60-70% acc.    Problem: Comprehension STGs  Goal: STG-Within one week, patient will  1) Individualized goal:  Patient will match words to pictures in field of 6 with 60% acc.  2) Interventions:  SLP Speech Language Treatment and SLP Group Treatment     Outcome: MET Date Met: 06/28/18  Performs with 80% acc.    Problem: Expression STGs  Goal: STG-Within one week, patient will  1) Individualized goal:  Will generate 6 words for given simple category with in one minute.   2) Interventions:  SLP Speech Language Treatment and SLP Group Treatment     Outcome: MET Date Met: 06/28/18  Patient generated 6 words for given category in outcomes assessment.  Goal: STG-Within one week, patient will  1) Individualized goal:  Describe routine activity sequences and action pictures with 90% acc.  2) Interventions:  SLP Speech Language Treatment and SLP Group Treatment     Outcome: DISCHARGED-GOAL NOT MET Date Met: 06/28/18  80-90% acc for word finding.

## 2018-06-28 NOTE — DISCHARGE PLANNING
Case Management;  Received phone call from patient's son to confirm d/c.  He will pick patient up tomorrow.

## 2018-06-28 NOTE — CARE PLAN
Problem: Safety  Goal: Will remain free from injury    Intervention: Provide assistance with mobility  No unsafe behaviors noted. Calls for assist to walk around

## 2018-06-28 NOTE — DISCHARGE INSTRUCTIONS
[facility] NURSING DISCHARGE INSTRUCTIONS    Blood Pressure : 119/74  Weight: 95.9 kg (211 lb 6.7 oz)  Nursing recommendations for Cortez Pereiralister at time of discharge are as follows:  Client and Family Member verbalized understanding of all discharge instructions and prescriptions.     Review all your home medications and newly ordered medications with your doctor and/or pharmacist. Follow medication instructions as directed by your doctor and/or pharmacist.    Pain Management:   Discharge Pain Medication Instructions:  Comfort Goal: Comfort at Rest  Notify your primary care provider if pain is unrelieved with these measures, if the pain is new, or increased in intensity.    Discharge Skin Characteristics: Warm, Dry  Discharge Skin Exam: Clear     Skin / Wound Care Instructions: Please contact your primary care physician for any change in skin integrity.     If You Have Surgical Incisions / Wounds:  Monitor surgical site(s) for signs of increased swelling, redness or symptoms of drainage from the site or fever as this could indicate signs and symptoms of infection. If these symptoms are noted, notifiy your primary care provider.      Discharge Safety Instructions: No Supervision Needed     Discharge Safety Concerns: No Concerns Noted  The interdisciplinary team has made recommendation that you do not require supervision in the house due to   Anti-embolic stockings are not required to increase circulation to the lower extremities.    Discharge Diet: regular     Discharge Liquids: thin  Discharge Bowel Function: Continent  Please contact your primary care physician for any changes in bowel habits.  Discharge Bowel Program:    Discharge Bladder Function: Continent  Discharge Urinary Devices: None      Nursing Discharge Plan:   Smoking Cessation Offered: Patient Counseled  Influenza Vaccine Indication: Patient Refuses    Case Management Discharge Instructions:   Discharge Location: Home  with Outpatient Services  Agency Name/Address/Phone: [facility] at [address] [place] [address]. [phone], [phone] (they will call you to schedule visits)  Home Health:    Outpatient Services: Occupational Therapy, Physical Therapy, Speech Therapy  DME Provider/Phone: No additional equipment ordered.  Medical Equipment Ordered:    Prescription Faxed to:        Discharge Medication Instructions:  Below are the medications your physician expects you to take upon discharge:    Cholesterol  Cholesterol is a white, waxy, fat-like substance needed by your body in small amounts. The liver makes all the cholesterol you need. Cholesterol is carried from the liver by the blood through the blood vessels. Deposits of cholesterol (plaque) may build up on blood vessel walls. These make the arteries narrower and stiffer. Cholesterol plaques increase the risk for heart attack and stroke.   You cannot feel your cholesterol level even if it is very high. The only way to know it is high is with a blood test. Once you know your cholesterol levels, you should keep a record of the test results. Work with your health care provider to keep your levels in the desired range.   WHAT DO THE RESULTS MEAN?  · Total cholesterol is a rough measure of all the cholesterol in your blood.    · LDL is the so-called bad cholesterol. This is the type that deposits cholesterol in the walls of the arteries. You want this level to be low.    · HDL is the good cholesterol because it cleans the arteries and carries the LDL away. You want this level to be high.  · Triglycerides are fat that the body can either burn for energy or store. High levels are closely linked to heart disease.    WHAT ARE THE DESIRED LEVELS OF CHOLESTEROL?  · Total cholesterol below 200.    · LDL below 100 for people at risk, below 70 for those at very high risk.    · HDL above 50 is good, above 60 is best.    · Triglycerides below 150.    HOW CAN I LOWER  MY CHOLESTEROL?  · Diet. Follow your diet programs as directed by your health care provider.    ¨ Choose fish or white meat chicken and turkey, roasted or baked. Limit fatty cuts of red meat, fried foods, and processed meats, such as sausage and lunch meats.    ¨ Eat lots of fresh fruits and vegetables.  ¨ Choose whole grains, beans, pasta, potatoes, and cereals.    ¨ Use only small amounts of olive, corn, or canola oils.    ¨ Avoid butter, mayonnaise, shortening, or palm kernel oils.  ¨ Avoid foods with trans fats.    ¨ Drink skim or nonfat milk and eat low-fat or nonfat yogurt and cheeses. Avoid whole milk, cream, ice cream, egg yolks, and full-fat cheeses.    ¨ Healthy desserts include thuy food cake, evin snaps, animal crackers, hard candy, popsicles, and low-fat or nonfat frozen yogurt. Avoid pastries, cakes, pies, and cookies.    · Exercise. Follow your exercise programs as directed by your health care provider.    ¨ A regular program helps decrease LDL and raise HDL.    ¨ A regular program helps with weight control.    ¨ Do things that increase your activity level like gardening, walking, or taking the stairs. Ask your health care provider about how you can be more active in your daily life.    · Medicine. Take medicine only as directed by your health care provider.    ¨ Medicine may be prescribed by your health care provider to help lower cholesterol and decrease the risk for heart disease.    ¨ If you have several risk factors, you may need medicine even if your levels are normal.     This information is not intended to replace advice given to you by your health care provider. Make sure you discuss any questions you have with your health care provider.     Document Released: 09/12/2002 Document Revised: 01/08/2016 Document Reviewed: 10/01/2014  [employer] Interactive Patient Education ©2016 [employer]    Hypertension  Hypertension, commonly called high blood pressure, is when the force of blood pumping  through your arteries is too strong. Your arteries are the blood vessels that carry blood from your heart throughout your body. A blood pressure reading consists of a higher number over a lower number, such as 110/72. The higher number (systolic) is the pressure inside your arteries when your heart pumps. The lower number (diastolic) is the pressure inside your arteries when your heart relaxes. Ideally you want your blood pressure below 120/80.  Hypertension forces your heart to work harder to pump blood. Your arteries may become narrow or stiff. Having untreated or uncontrolled hypertension can cause heart attack, stroke, kidney disease, and other problems.  What increases the risk?  Some risk factors for high blood pressure are controllable. Others are not.  Risk factors you cannot control include:  · Race. You may be at higher risk if you are .  · Age. Risk increases with age.  · Gender. Men are at higher risk than women before age 45 years. After age 65, women are at higher risk than men.  Risk factors you can control include:  · Not getting enough exercise or physical activity.  · Being overweight.  · Getting too much fat, sugar, calories, or salt in your diet.  · Drinking too much alcohol.  What are the signs or symptoms?  Hypertension does not usually cause signs or symptoms. Extremely high blood pressure (hypertensive crisis) may cause headache, anxiety, shortness of breath, and nosebleed.  How is this diagnosed?  To check if you have hypertension, your health care provider will measure your blood pressure while you are seated, with your arm held at the level of your heart. It should be measured at least twice using the same arm. Certain conditions can cause a difference in blood pressure between your right and left arms. A blood pressure reading that is higher than normal on one occasion does not mean that you need treatment. If it is not clear whether you have high blood pressure, you may  be asked to return on a different day to have your blood pressure checked again. Or, you may be asked to monitor your blood pressure at home for 1 or more weeks.  How is this treated?  Treating high blood pressure includes making lifestyle changes and possibly taking medicine. Living a healthy lifestyle can help lower high blood pressure. You may need to change some of your habits.  Lifestyle changes may include:  · Following the DASH diet. This diet is high in fruits, vegetables, and whole grains. It is low in salt, red meat, and added sugars.  · Keep your sodium intake below 2,300 mg per day.  · Getting at least 30-45 minutes of aerobic exercise at least 4 times per week.  · Losing weight if necessary.  · Not smoking.  · Limiting alcoholic beverages.  · Learning ways to reduce stress.  Your health care provider may prescribe medicine if lifestyle changes are not enough to get your blood pressure under control, and if one of the following is true:  · You are 18-59 years of age and your systolic blood pressure is above 140.  · You are 60 years of age or older, and your systolic blood pressure is above 150.  · Your diastolic blood pressure is above 90.  · You have diabetes, and your systolic blood pressure is over 140 or your diastolic blood pressure is over 90.  · You have kidney disease and your blood pressure is above 140/90.  · You have heart disease and your blood pressure is above 140/90.  Your personal target blood pressure may vary depending on your medical conditions, your age, and other factors.  Follow these instructions at home:  · Have your blood pressure rechecked as directed by your health care provider.  · Take medicines only as directed by your health care provider. Follow the directions carefully. Blood pressure medicines must be taken as prescribed. The medicine does not work as well when you skip doses. Skipping doses also puts you at risk for problems.  · Do not smoke.  · Monitor your blood  pressure at home as directed by your health care provider.  Contact a health care provider if:  · You think you are having a reaction to medicines taken.  · You have recurrent headaches or feel dizzy.  · You have swelling in your ankles.  · You have trouble with your vision.  Get help right away if:  · You develop a severe headache or confusion.  · You have unusual weakness, numbness, or feel faint.  · You have severe chest or abdominal pain.  · You vomit repeatedly.  · You have trouble breathing.  This information is not intended to replace advice given to you by your health care provider. Make sure you discuss any questions you have with your health care provider.  Document Released: 12/18/2006 Document Revised: 05/25/2017 Document Reviewed: 10/10/2014  [employer] Interactive Patient Education © 2017 [employer]      Depression / Suicide Risk    As you are discharged from this [facility] facility, it is important to learn how to keep safe from harming yourself.    Recognize the warning signs:  · Abrupt changes in personality, positive or negative- including increase in energy   · Giving away possessions  · Change in eating patterns- significant weight changes-  positive or negative  · Change in sleeping patterns- unable to sleep or sleeping all the time   · Unwillingness or inability to communicate  · Depression  · Unusual sadness, discouragement and loneliness  · Talk of wanting to die  · Neglect of personal appearance   · Rebelliousness- reckless behavior  · Withdrawal from people/activities they love  · Confusion- inability to concentrate     If you or a loved one observes any of these behaviors or has concerns about self-harm, here's what you can do:  · Talk about it- your feelings and reasons for harming yourself  · Remove any means that you might use to hurt yourself (examples: pills, rope, extension cords, firearm)  · Get professional help from the community (Mental Health, Substance Abuse, psychological  counseling)  · Do not be alone:Call your Safe Contact- someone whom you trust who will be there for you.  · Call your local CRISIS HOTLINE 236-1189 or 559-279-6770  · Call your local Children's Mobile Crisis Response Team [place] (593) 366-4413 or www.[employer]  · Call the toll free National Suicide Prevention Hotlines   · National Suicide Prevention Lifeline 566-381-UAQU (3800)  · National Hope Line Network 800-SUICIDE (631-0781)    Fall Prevention in the Home  Falls can cause injuries and can affect people from all age groups. There are many simple things that you can do to make your home safe and to help prevent falls.  What can I do on the outside of my home?  · Regularly repair the edges of walkways and driveways and fix any cracks.  · Remove high doorway thresholds.  · Trim any shrubbery on the main path into your home.  · Use bright outdoor lighting.  · Clear walkways of debris and clutter, including tools and rocks.  · Regularly check that handrails are securely fastened and in good repair. Both sides of any steps should have handrails.  · Install guardrails along the edges of any raised decks or porches.  · Have leaves, snow, and ice cleared regularly.  · Use sand or salt on walkways during winter months.  · In the garage, clean up any spills right away, including grease or oil spills.  What can I do in the bathroom?  · Use night lights.  · Install grab bars by the toilet and in the tub and shower. Do not use towel bars as grab bars.  · Use non-skid mats or decals on the floor of the tub or shower.  · If you need to sit down while you are in the shower, use a plastic, non-slip stool.  · Keep the floor dry. Immediately clean up any water that spills on the floor.  · Remove soap buildup in the tub or shower on a regular basis.  · Attach bath mats securely with double-sided non-slip rug tape.  · Remove throw rugs and other tripping hazards from the floor.  What can I do in the bedroom?  · Use  night lights.  · Make sure that a bedside light is easy to reach.  · Do not use oversized bedding that drapes onto the floor.  · Have a firm chair that has side arms to use for getting dressed.  · Remove throw rugs and other tripping hazards from the floor.  What can I do in the kitchen?  · Clean up any spills right away.  · Avoid walking on wet floors.  · Place frequently used items in easy-to-reach places.  · If you need to reach for something above you, use a sturdy step stool that has a grab bar.  · Keep electrical cables out of the way.  · Do not use floor polish or wax that makes floors slippery. If you have to use wax, make sure that it is non-skid floor wax.  · Remove throw rugs and other tripping hazards from the floor.  What can I do in the stairways?  · Do not leave any items on the stairs.  · Make sure that there are handrails on both sides of the stairs. Fix handrails that are broken or loose. Make sure that handrails are as long as the stairways.  · Check any carpeting to make sure that it is firmly attached to the stairs. Fix any carpet that is loose or worn.  · Avoid having throw rugs at the top or bottom of stairways, or secure the rugs with carpet tape to prevent them from moving.  · Make sure that you have a light switch at the top of the stairs and the bottom of the stairs. If you do not have them, have them installed.  What are some other fall prevention tips?  · Wear closed-toe shoes that fit well and support your feet. Wear shoes that have rubber soles or low heels.  · When you use a stepladder, make sure that it is completely opened and that the sides are firmly locked. Have someone hold the ladder while you are using it. Do not climb a closed stepladder.  · Add color or contrast paint or tape to grab bars and handrails in your home. Place contrasting color strips on the first and last steps.  · Use mobility aids as needed, such as canes, walkers, scooters, and crutches.  · Turn on lights if  it is dark. Replace any light bulbs that burn out.  · Set up furniture so that there are clear paths. Keep the furniture in the same spot.  · Fix any uneven floor surfaces.  · Choose a carpet design that does not hide the edge of steps of a stairway.  · Be aware of any and all pets.  · Review your medicines with your healthcare provider. Some medicines can cause dizziness or changes in blood pressure, which increase your risk of falling.  Talk with your health care provider about other ways that you can decrease your risk of falls. This may include working with a physical therapist or  to improve your strength, balance, and endurance.  This information is not intended to replace advice given to you by your health care provider. Make sure you discuss any questions you have with your health care provider.  Document Released: 12/08/2003 Document Revised: 05/16/2017 Document Reviewed: 01/22/2016  [employer] Interactive Patient Education © 2017 [employer]  Physical Therapy Discharge Instructions for Cortez Pereiralister    6/27/2018    Level of Assist Required for Ambulation: No Assist on Flat Surfaces, No Assist on Curbs, Supervision on Stairs  Distance Patient May Ambulate:  (community distances)  Device Recommended for Ambulation: None  Device Recommended for Transfers: None  Home Exercise Program: Refer to Home Exercise Program Handout for Details  Best of anshul Hernandez! Alondra Brandon PT  Occupational Therapy Discharge Instructions for Cortez Mari    6/27/2018    Level of Assist Required for Eating: Able to Complete Eating without Assist  Level of Assist Required for Grooming: Able to Complete Grooming without Assist  Level of Assist Required for Dressing: Able to Complete Dressing without Assist  Level of Assist Required for Toileting: Able to Complete Toileting without Assist  Level of Assist Required for Toilet Transfer: Able to Complete Toilet Transfer without Assist  Level of Assist Required  for Bathing: Able to Complete Bathing without Assist  Level of Assist Required for Shower Transfer: Able to Complete Shower Transfer without Assist  Level of Assist Required for Home Mgmt: Requires Supervision with Home Management  Level of Assist Required for Meal Prep: Requires Supervision with Meal Preparation  Driving: May not Drive, Please Contact Physician for Further Information  Home Exercise Program: None Issued  Comments: Good luck back at home, David.  It was good working with you.  Take care, Erickson, OT    Speech Therapy Discharge Instructions for Cortez aMri    6/28/2018    Diet: Regular - all foods allowed, Thin Liquids - any liquid like water, coffee, tea, juices, or clear fluids like Gatorade, etc.    Supervision: Please see PT/OT recommendations for supervision during activities.    Cognition / Communication: Word finding strategies:  Describe or talk about features of the intended word.What it looks like.How it functions/ purpose.How it feels. How it is similar or different from other things. Use gestures or pantomime to demonstrate how used.  Allow extra time to respond.  Keep talking.   Patient will benefit from having some one accompany him to appointments to help with communication.  Please see home exercise packets.  Recommend you work through exercise packets with a family member or friend.   Recommend use of a pill sorting box to keep track of medications.  Recommend supervision with medication, healthcare and financial managment.  Recommend a caregiver be with you when you are path finding in a community outing.  It may be easy to lose your way.  Recommend continue with outpatient speech therapy services.     It has been a pleasure working with you.  Jazmin Dumont M.S. CCC-SLP

## 2018-06-28 NOTE — PROGRESS NOTES
Patient discharged to home per order.  Discharge instructions reviewed with patient and SO; they verbalize understanding and signed copies placed in chart.  Patient has all belongings; signed copy of form in chart.  Patient left facility at 1255 via walking accompanied by rehab staff and SO.  Have enjoyed working with this pleasant patient.

## 2018-06-28 NOTE — PROGRESS NOTES
PATIENT DISCHARGE MEDICATION COUNSELING:  This patient received individualized medication counseling regarding the current regimen they are receiving in this facility. Potential adverse effects, monitoring parameters, and proper administration were covered in preparation for their discharge. The patient asked relevant questions regarding the medications for which answers were provided. Our pharmacy hours and phone number were provided for follow up questions should they arise.           Antihypertensives: This patient is being treated for hypertension and it is recommended that they monitor and record with a blood pressure device. The patient has been instructed to contact their primary care physician if the HR or blood pressure is abnormal.         Shaheed MoralesD

## 2018-06-29 NOTE — DISCHARGE PLANNING
Case Management:  Late entry for 6/28/18.  Met with patient and his significant other prior to d/c.  Reviewed all follow up appointments.  [facility] and ready to follow.  No additional dme needed.  Family training with significant other completed.  Patient is verbally agreeable with all plans and verbalizes understanding of next next steps.

## 2018-06-29 NOTE — PROGRESS NOTES
DATE OF SERVICE:  06/26/2018    The patient was seen for followup visit.  Patient's speech is improving   slowly.  He is less dysarthric.  He still is showing no word finding   difficulties, but it is not as pronounced.  Patient's mood seems to be   improving slightly.  Patient was spoken to about his plans to return home and   the need for him to consistently follow through on recommendations for his   daily rehab.       ____________________________________     SRIRAM HENDRICKS, PHD    NERY / MALIK    DD:  06/29/2018 11:41:18  DT:  06/29/2018 12:21:26    D#:  [id number]  Job#:  [id number]

## 2018-06-30 NOTE — PROGRESS NOTES
DATE OF SERVICE:  06/27/2018    Patient will be discharged soon.  He is doing much better at followup.  His   speech is improving.  His mood is more optimistic.  Patient was spoken to at   some length about his worries and concerns about making further gains.  The   patient complained that he believes his change has not ____.  The consequences   of this perception were discussed with him.  He was reassured that his core   belief/values are not altered, but he does have some deficits and with   continued work he will show improvement.  This led to a discussion of short   and long-term goals related to his home program.       ____________________________________     SRIRAM HENDRICKS, PHD    NERY / MALIK    DD:  06/29/2018 11:58:37  DT:  06/29/2018 13:03:55    D#:  [id number]  Job#:  [id number]

## 2018-07-12 ENCOUNTER — OFFICE VISIT (OUTPATIENT)
Dept: CARDIOLOGY | Facility: MEDICAL CENTER | Age: 68
End: 2018-07-12
Payer: MEDICARE

## 2018-07-12 VITALS
BODY MASS INDEX: 29.26 KG/M2 | OXYGEN SATURATION: 96 % | SYSTOLIC BLOOD PRESSURE: 136 MMHG | HEART RATE: 76 BPM | HEIGHT: 72 IN | RESPIRATION RATE: 12 BRPM | DIASTOLIC BLOOD PRESSURE: 88 MMHG | WEIGHT: 216 LBS

## 2018-07-12 DIAGNOSIS — E78.49 OTHER HYPERLIPIDEMIA: ICD-10-CM

## 2018-07-12 DIAGNOSIS — I10 ESSENTIAL HYPERTENSION: ICD-10-CM

## 2018-07-12 DIAGNOSIS — Z95.818 STATUS POST PLACEMENT OF IMPLANTABLE LOOP RECORDER: ICD-10-CM

## 2018-07-12 DIAGNOSIS — I63.9 CEREBROVASCULAR ACCIDENT (CVA), UNSPECIFIED MECHANISM (HCC): ICD-10-CM

## 2018-07-12 PROCEDURE — 99214 OFFICE O/P EST MOD 30 MIN: CPT | Performed by: NURSE PRACTITIONER

## 2018-07-12 PROCEDURE — 93298 REM INTERROG DEV EVAL SCRMS: CPT | Performed by: NURSE PRACTITIONER

## 2018-07-12 ASSESSMENT — ENCOUNTER SYMPTOMS
FOCAL WEAKNESS: 1
PALPITATIONS: 0
VOMITING: 0
BLURRED VISION: 0
ORTHOPNEA: 0
SPEECH CHANGE: 0
TREMORS: 0
DIZZINESS: 0
NAUSEA: 0
SORE THROAT: 0
HEMOPTYSIS: 0
SPUTUM PRODUCTION: 0
HEARTBURN: 0
WEIGHT LOSS: 0
COUGH: 0
WHEEZING: 0
WEAKNESS: 1
LOSS OF CONSCIOUSNESS: 0
DIARRHEA: 0
HEADACHES: 0
CHILLS: 0
DOUBLE VISION: 0
TINGLING: 0
SENSORY CHANGE: 0
PND: 0
INSOMNIA: 1
STRIDOR: 0
FEVER: 0
SHORTNESS OF BREATH: 0

## 2018-07-12 NOTE — PROGRESS NOTES
Cardiology/Electrophysiology Follow-up Note      Subjective:   Chief Complaint:   Chief Complaint   Patient presents with   • Implantable Loop Recorder       Cortez Mari is a 68 y.o. male who presents today for follow up after ILR placed while he was admitted to the hospital for embolic CVA.  It was recommended that ILR be placed to evaluate for subclinical AF.     Past medical history also significant for HTN, HLD, Aphasia and some right sided residual post CVA.    Today in follow up he is accompanied by his wife.  He was sent to [facility] rehab and has been home approximately 2 weeks.  Per his wife he is starting to do more and more every day.  They have not set up the medtronic home transmitter yet as they were unsure what it is used for and how to do it.  Today  denies chest pain, dizziness, palpitations, pre syncope or syncope, dyspnea, PND, orthopnea, or lower extremity edema.  He has been having difficultly sleeping per report most nights.      Patient endorses medication compliance      Past Medical History:   Diagnosis Date   • Smoker      Past Surgical History:   Procedure Laterality Date   • INGUINAL HERNIA REPAIR  2/14/2011    Performed by KELLY CHIN at SURGERY SAME DAY [facility] ORS   • OTHER      eye     History reviewed. No pertinent family history.  Social History     Social History   • Marital status:      Spouse name: N/A   • Number of children: N/A   • Years of education: N/A     Occupational History   • Not on file.     Social History Main Topics   • Smoking status: Former Smoker     Packs/day: 1.00     Types: Cigarettes     Quit date: 6/12/2018   • Smokeless tobacco: Never Used      Comment: 0.5ppd x35 yrs   • Alcohol use No   • Drug use: No   • Sexual activity: Not on file     Other Topics Concern   • Not on file     Social History Narrative   • No narrative on file     No Known Allergies    Current Outpatient Prescriptions   Medication Sig Dispense Refill   •  acetaminophen (TYLENOL) 325 MG Tab Take 2 Tabs by mouth every four hours as needed. 30 Tab 0   • aspirin (ASA) 325 MG Tab Take 1 Tab by mouth every day. 100 Tab 2   • atorvastatin (LIPITOR) 80 MG tablet Take 1 Tab by mouth every evening. 30 Tab 2   • lisinopril (PRINIVIL) 2.5 MG Tab Take 1 Tab by mouth every day. 30 Tab 2   • vitamin D 2000 UNIT Tab Take 1 Tab by mouth every day. 60 Tab      No current facility-administered medications for this visit.        Review of Systems   Constitutional: Positive for malaise/fatigue. Negative for chills, fever and weight loss.   HENT: Negative for congestion, sore throat and tinnitus.    Eyes: Negative for blurred vision and double vision.   Respiratory: Negative for cough, hemoptysis, sputum production, shortness of breath, wheezing and stridor.    Cardiovascular: Negative for chest pain, palpitations, orthopnea, leg swelling and PND.   Gastrointestinal: Negative for diarrhea, heartburn, nausea and vomiting.   Skin: Negative for rash.   Neurological: Positive for focal weakness (S/P CVA) and weakness. Negative for dizziness, tingling, tremors, sensory change, speech change (S/P CVA), loss of consciousness and headaches.   Psychiatric/Behavioral: The patient has insomnia.      All others systems reviewed and negative.     Objective:     Blood pressure 136/88, pulse 76, resp. rate 12, height 1.829 m (6'), weight 98 kg (216 lb), SpO2 96 %. Body mass index is 29.29 kg/m².    Physical Exam   Constitutional: He is oriented to person, place, and time and well-developed, well-nourished, and in no distress.   HENT:   Head: Normocephalic and atraumatic.   Eyes: Conjunctivae and EOM are normal. Pupils are equal, round, and reactive to light.   Neck: Normal range of motion. Neck supple. No JVD present. No tracheal deviation present.   Cardiovascular: Normal rate, regular rhythm, normal heart sounds and intact distal pulses.  Exam reveals no gallop and no friction rub.    No murmur  heard.  Pulses:       Radial pulses are 2+ on the right side, and 2+ on the left side.        Dorsalis pedis pulses are 2+ on the right side, and 2+ on the left side.        Posterior tibial pulses are 2+ on the right side, and 2+ on the left side.   Pulmonary/Chest: Effort normal and breath sounds normal. No stridor. No respiratory distress. He has no wheezes. He has no rales. He exhibits no tenderness.   Abdominal: Soft. Bowel sounds are normal.   Musculoskeletal: Normal range of motion. He exhibits no edema.   Neurological: He is alert and oriented to person, place, and time.   Slight weakness to left side noted.    Skin: Skin is warm and dry.   Psychiatric: Mood, memory, affect and judgment normal.         Cardiac Imaging and Procedures Review:    ILR interrogation dated 7/12/18:  No bradycardia or arrhythmias seen in recorder.     Labs (personally reviewed and notable for):   Lab Results   Component Value Date/Time    SODIUM 141 06/21/2018 05:27 AM    POTASSIUM 3.9 06/21/2018 05:27 AM    CHLORIDE 111 06/21/2018 05:27 AM    CO2 24 06/21/2018 05:27 AM    GLUCOSE 95 06/21/2018 05:27 AM    BUN 20 06/21/2018 05:27 AM    CREATININE 0.88 06/21/2018 05:27 AM      Lab Results   Component Value Date/Time    WBC 6.8 06/21/2018 05:27 AM    RBC 4.98 06/21/2018 05:27 AM    HEMOGLOBIN 15.1 06/21/2018 05:27 AM    HEMATOCRIT 44.1 06/21/2018 05:27 AM    MCV 88.6 06/21/2018 05:27 AM    MCH 30.3 06/21/2018 05:27 AM    MCHC 34.2 06/21/2018 05:27 AM    MPV 10.6 06/21/2018 05:27 AM    NEUTSPOLYS 60.90 06/21/2018 05:27 AM    LYMPHOCYTES 26.30 06/21/2018 05:27 AM    MONOCYTES 9.90 06/21/2018 05:27 AM    EOSINOPHILS 2.20 06/21/2018 05:27 AM    BASOPHILS 0.60 06/21/2018 05:27 AM      PT/INR:   Lab Results   Component Value Date/Time    PROTHROMBTM 13.2 06/17/2018 01:55 AM    INR 1.03 06/17/2018 01:55 AM       Assessment:     1. Cerebrovascular accident (CVA), unspecified mechanism (HCC)     2. Status post placement of implantable loop  recorder     3. Essential hypertension     4. Other hyperlipidemia         Medical Decision Making:  Today's Assessment / Status / Plan:   1. CVA S/P ILR placement for evaluation of possible subclinical AF:  - Device interrogation today reveals no arrhythmias or bradycardiac events.   - I have instructed the patient and his wife on the purpose of the home monitor and how it functions.  They verbalize understanding and will set it up tonight.   - continue ASA and lipitor.  - Needs to establish with PCP for further care and follow up with neurology for stroke care.     2. HTN:  - BP well controled in office today.  Continue current medications.    3. HLD:  - Lipids elevated in June.  , hdl 31.  Now on Lipitor.  Denies myalgias.    - He will need to have this reevaluated and followed by his PCP or neurologist.     Plan reviewed in detail with the patient and his wife and they verbalize understanding and are in agreement.   RTC in 3 months in device clinic.  Collaborating MD/ADD: ADRIAN Valadez.

## 2018-08-01 ENCOUNTER — TELEPHONE (OUTPATIENT)
Dept: NEUROLOGY | Facility: MEDICAL CENTER | Age: 68
End: 2018-08-01

## 2018-08-01 NOTE — TELEPHONE ENCOUNTER
Called number listed on pt's chart (Girlfriends #) and no answer. I left a message for Cortez letting him know that he needs to be scheduled for the stroke bridge clinic, I left him a call back number so we can talk about the dates and times he is available.

## 2018-08-06 ENCOUNTER — TELEPHONE (OUTPATIENT)
Dept: PHYSICAL MEDICINE AND REHAB | Facility: REHABILITATION | Age: 68
End: 2018-08-06

## 2018-08-06 ENCOUNTER — OFFICE VISIT (OUTPATIENT)
Dept: PHYSICAL MEDICINE AND REHAB | Facility: REHABILITATION | Age: 68
End: 2018-08-06
Payer: MEDICARE

## 2018-08-06 VITALS
DIASTOLIC BLOOD PRESSURE: 78 MMHG | BODY MASS INDEX: 29.12 KG/M2 | SYSTOLIC BLOOD PRESSURE: 120 MMHG | HEART RATE: 91 BPM | TEMPERATURE: 97.6 F | WEIGHT: 215 LBS | OXYGEN SATURATION: 97 % | HEIGHT: 72 IN

## 2018-08-06 DIAGNOSIS — R48.0 ALEXIA: ICD-10-CM

## 2018-08-06 DIAGNOSIS — I63.9 ACUTE CVA (CEREBROVASCULAR ACCIDENT) (HCC): ICD-10-CM

## 2018-08-06 PROCEDURE — 99214 OFFICE O/P EST MOD 30 MIN: CPT | Performed by: PHYSICAL MEDICINE & REHABILITATION

## 2018-08-06 NOTE — TELEPHONE ENCOUNTER
Pt or patients wife will be calling to give us the name of the place patient has been doing therapy. Pt is also going to call us when he has completed his eye doctor appointment so that we can get him schedudled for a FV.
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
## 2018-08-13 ENCOUNTER — TELEPHONE (OUTPATIENT)
Dept: PHYSICAL MEDICINE AND REHAB | Facility: MEDICAL CENTER | Age: 68
End: 2018-08-13

## 2018-08-13 NOTE — TELEPHONE ENCOUNTER
Rosanne called and she wanting to call you about the referral for Hearing test as what discussed last appt dated 8/6/18.    Rosanne mentioned that Cortez was having symptoms like,  Severe headache, slurred speech, droopy face and hearing a loud noise.    As per Rosanne Pt had 4x hx of stroke and refused to go to ER due to financial problem.    I did highly recommend that it is more safer to bring him to ER for further evaluation and then the referral for hearing test will follow after Pt get discharge from the hospital and PCP can take care that one for them or have a Follow up appt with you.    Rosanne agreed and she will drive Cortez to the ER.    Thank you  Masha

## 2018-08-16 ENCOUNTER — TELEPHONE (OUTPATIENT)
Dept: PHYSICAL MEDICINE AND REHAB | Facility: MEDICAL CENTER | Age: 68
End: 2018-08-16

## 2018-08-16 NOTE — TELEPHONE ENCOUNTER
Pt Wife called again to see the status on the referral for a  hearing doctor. I moved her apt up to September because she feels that  has been the only doctor to get through to the Pt regarding his care.

## 2018-09-17 ENCOUNTER — APPOINTMENT (OUTPATIENT)
Dept: PHYSICAL MEDICINE AND REHAB | Facility: REHABILITATION | Age: 68
End: 2018-09-17
Payer: MEDICARE

## 2019-01-11 ENCOUNTER — NON-PROVIDER VISIT (OUTPATIENT)
Dept: CARDIOLOGY | Facility: MEDICAL CENTER | Age: 69
End: 2019-01-11
Payer: MEDICARE

## 2019-01-11 DIAGNOSIS — I63.9 ACUTE CVA (CEREBROVASCULAR ACCIDENT) (HCC): ICD-10-CM

## 2019-01-11 PROCEDURE — 93298 REM INTERROG DEV EVAL SCRMS: CPT | Performed by: INTERNAL MEDICINE

## 2019-03-15 ENCOUNTER — APPOINTMENT (OUTPATIENT)
Dept: CARDIOLOGY | Facility: MEDICAL CENTER | Age: 69
End: 2019-03-15
Payer: MEDICARE

## 2020-08-11 ENCOUNTER — APPOINTMENT (OUTPATIENT)
Dept: RADIOLOGY | Facility: MEDICAL CENTER | Age: 70
End: 2020-08-11
Attending: EMERGENCY MEDICINE
Payer: OTHER MISCELLANEOUS

## 2020-08-11 ENCOUNTER — APPOINTMENT (OUTPATIENT)
Dept: RADIOLOGY | Facility: MEDICAL CENTER | Age: 70
End: 2020-08-11
Payer: OTHER MISCELLANEOUS

## 2020-08-11 ENCOUNTER — HOSPITAL ENCOUNTER (EMERGENCY)
Facility: MEDICAL CENTER | Age: 70
End: 2020-08-11
Attending: EMERGENCY MEDICINE
Payer: OTHER MISCELLANEOUS

## 2020-08-11 VITALS
OXYGEN SATURATION: 97 % | BODY MASS INDEX: 29.52 KG/M2 | TEMPERATURE: 98.9 F | RESPIRATION RATE: 16 BRPM | WEIGHT: 230 LBS | SYSTOLIC BLOOD PRESSURE: 149 MMHG | HEART RATE: 84 BPM | DIASTOLIC BLOOD PRESSURE: 82 MMHG | HEIGHT: 74 IN

## 2020-08-11 DIAGNOSIS — S29.8XXA BLUNT TRAUMA TO CHEST, INITIAL ENCOUNTER: ICD-10-CM

## 2020-08-11 PROCEDURE — 305948 HCHG GREEN TRAUMA ACT PRE-NOTIFY NO CC

## 2020-08-11 PROCEDURE — 99284 EMERGENCY DEPT VISIT MOD MDM: CPT

## 2020-08-11 PROCEDURE — 71046 X-RAY EXAM CHEST 2 VIEWS: CPT
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
